# Patient Record
Sex: FEMALE | Race: WHITE | NOT HISPANIC OR LATINO | URBAN - METROPOLITAN AREA
[De-identification: names, ages, dates, MRNs, and addresses within clinical notes are randomized per-mention and may not be internally consistent; named-entity substitution may affect disease eponyms.]

---

## 2018-01-01 ENCOUNTER — INPATIENT (INPATIENT)
Facility: HOSPITAL | Age: 78
LOS: 8 days | DRG: 377 | End: 2018-05-06
Attending: INTERNAL MEDICINE | Admitting: HOSPITALIST
Payer: MEDICARE

## 2018-01-01 VITALS
WEIGHT: 138.01 LBS | OXYGEN SATURATION: 100 % | HEIGHT: 66 IN | DIASTOLIC BLOOD PRESSURE: 68 MMHG | HEART RATE: 87 BPM | SYSTOLIC BLOOD PRESSURE: 118 MMHG | RESPIRATION RATE: 16 BRPM | TEMPERATURE: 98 F

## 2018-01-01 VITALS — HEART RATE: 101 BPM

## 2018-01-01 DIAGNOSIS — J96.01 ACUTE RESPIRATORY FAILURE WITH HYPOXIA: ICD-10-CM

## 2018-01-01 DIAGNOSIS — K92.2 GASTROINTESTINAL HEMORRHAGE, UNSPECIFIED: ICD-10-CM

## 2018-01-01 DIAGNOSIS — E87.1 HYPO-OSMOLALITY AND HYPONATREMIA: ICD-10-CM

## 2018-01-01 DIAGNOSIS — D50.0 IRON DEFICIENCY ANEMIA SECONDARY TO BLOOD LOSS (CHRONIC): ICD-10-CM

## 2018-01-01 DIAGNOSIS — R74.8 ABNORMAL LEVELS OF OTHER SERUM ENZYMES: ICD-10-CM

## 2018-01-01 DIAGNOSIS — K92.1 MELENA: ICD-10-CM

## 2018-01-01 DIAGNOSIS — E11.8 TYPE 2 DIABETES MELLITUS WITH UNSPECIFIED COMPLICATIONS: ICD-10-CM

## 2018-01-01 DIAGNOSIS — I38 ENDOCARDITIS, VALVE UNSPECIFIED: ICD-10-CM

## 2018-01-01 DIAGNOSIS — Z29.9 ENCOUNTER FOR PROPHYLACTIC MEASURES, UNSPECIFIED: ICD-10-CM

## 2018-01-01 DIAGNOSIS — R57.0 CARDIOGENIC SHOCK: ICD-10-CM

## 2018-01-01 DIAGNOSIS — R55 SYNCOPE AND COLLAPSE: ICD-10-CM

## 2018-01-01 DIAGNOSIS — J18.9 PNEUMONIA, UNSPECIFIED ORGANISM: ICD-10-CM

## 2018-01-01 DIAGNOSIS — E86.0 DEHYDRATION: ICD-10-CM

## 2018-01-01 DIAGNOSIS — I25.10 ATHEROSCLEROTIC HEART DISEASE OF NATIVE CORONARY ARTERY WITHOUT ANGINA PECTORIS: ICD-10-CM

## 2018-01-01 DIAGNOSIS — I10 ESSENTIAL (PRIMARY) HYPERTENSION: ICD-10-CM

## 2018-01-01 DIAGNOSIS — I50.21 ACUTE SYSTOLIC (CONGESTIVE) HEART FAILURE: ICD-10-CM

## 2018-01-01 DIAGNOSIS — G93.40 ENCEPHALOPATHY, UNSPECIFIED: ICD-10-CM

## 2018-01-01 LAB
ABO RH CONFIRMATION: SIGNIFICANT CHANGE UP
ALBUMIN SERPL ELPH-MCNC: 2.2 G/DL — LOW (ref 3.3–5.2)
ALBUMIN SERPL ELPH-MCNC: 2.7 G/DL — LOW (ref 3.3–5.2)
ALBUMIN SERPL ELPH-MCNC: 2.9 G/DL — LOW (ref 3.3–5.2)
ALP SERPL-CCNC: 45 U/L — SIGNIFICANT CHANGE UP (ref 40–120)
ALP SERPL-CCNC: 50 U/L — SIGNIFICANT CHANGE UP (ref 40–120)
ALP SERPL-CCNC: 60 U/L — SIGNIFICANT CHANGE UP (ref 40–120)
ALT FLD-CCNC: 17 U/L — SIGNIFICANT CHANGE UP
ALT FLD-CCNC: 17 U/L — SIGNIFICANT CHANGE UP
ALT FLD-CCNC: 8 U/L — SIGNIFICANT CHANGE UP
AMMONIA BLD-MCNC: 18 UMOL/L — SIGNIFICANT CHANGE UP (ref 11–55)
ANION GAP SERPL CALC-SCNC: 11 MMOL/L — SIGNIFICANT CHANGE UP (ref 5–17)
ANION GAP SERPL CALC-SCNC: 12 MMOL/L — SIGNIFICANT CHANGE UP (ref 5–17)
ANION GAP SERPL CALC-SCNC: 14 MMOL/L — SIGNIFICANT CHANGE UP (ref 5–17)
ANION GAP SERPL CALC-SCNC: 14 MMOL/L — SIGNIFICANT CHANGE UP (ref 5–17)
ANION GAP SERPL CALC-SCNC: 15 MMOL/L — SIGNIFICANT CHANGE UP (ref 5–17)
ANION GAP SERPL CALC-SCNC: 16 MMOL/L — SIGNIFICANT CHANGE UP (ref 5–17)
ANION GAP SERPL CALC-SCNC: 16 MMOL/L — SIGNIFICANT CHANGE UP (ref 5–17)
ANION GAP SERPL CALC-SCNC: 17 MMOL/L — SIGNIFICANT CHANGE UP (ref 5–17)
ANION GAP SERPL CALC-SCNC: 27 MMOL/L — HIGH (ref 5–17)
ANISOCYTOSIS BLD QL: SLIGHT — SIGNIFICANT CHANGE UP
APTT BLD: 24.7 SEC — LOW (ref 27.5–37.4)
APTT BLD: 27.8 SEC — SIGNIFICANT CHANGE UP (ref 27.5–37.4)
APTT BLD: 42.4 SEC — HIGH (ref 27.5–37.4)
AST SERPL-CCNC: 21 U/L — SIGNIFICANT CHANGE UP
AST SERPL-CCNC: 44 U/L — HIGH
AST SERPL-CCNC: 74 U/L — HIGH
BASOPHILS # BLD AUTO: 0 K/UL — SIGNIFICANT CHANGE UP (ref 0–0.2)
BASOPHILS NFR BLD AUTO: 0.1 % — SIGNIFICANT CHANGE UP (ref 0–2)
BASOPHILS NFR BLD AUTO: 0.1 % — SIGNIFICANT CHANGE UP (ref 0–2)
BASOPHILS NFR BLD AUTO: 0.2 % — SIGNIFICANT CHANGE UP (ref 0–2)
BASOPHILS NFR BLD AUTO: 0.2 % — SIGNIFICANT CHANGE UP (ref 0–2)
BILIRUB SERPL-MCNC: 0.3 MG/DL — LOW (ref 0.4–2)
BILIRUB SERPL-MCNC: 0.4 MG/DL — SIGNIFICANT CHANGE UP (ref 0.4–2)
BILIRUB SERPL-MCNC: 0.4 MG/DL — SIGNIFICANT CHANGE UP (ref 0.4–2)
BLD GP AB SCN SERPL QL: SIGNIFICANT CHANGE UP
BUN SERPL-MCNC: 17 MG/DL — SIGNIFICANT CHANGE UP (ref 8–20)
BUN SERPL-MCNC: 17 MG/DL — SIGNIFICANT CHANGE UP (ref 8–20)
BUN SERPL-MCNC: 18 MG/DL — SIGNIFICANT CHANGE UP (ref 8–20)
BUN SERPL-MCNC: 18 MG/DL — SIGNIFICANT CHANGE UP (ref 8–20)
BUN SERPL-MCNC: 20 MG/DL — SIGNIFICANT CHANGE UP (ref 8–20)
BUN SERPL-MCNC: 22 MG/DL — HIGH (ref 8–20)
BUN SERPL-MCNC: 36 MG/DL — HIGH (ref 8–20)
BUN SERPL-MCNC: 36 MG/DL — HIGH (ref 8–20)
BUN SERPL-MCNC: 37 MG/DL — HIGH (ref 8–20)
BUN SERPL-MCNC: 39 MG/DL — HIGH (ref 8–20)
BUN SERPL-MCNC: 40 MG/DL — HIGH (ref 8–20)
BUN SERPL-MCNC: 42 MG/DL — HIGH (ref 8–20)
CALCIUM SERPL-MCNC: 6.7 MG/DL — LOW (ref 8.6–10.2)
CALCIUM SERPL-MCNC: 7.2 MG/DL — LOW (ref 8.6–10.2)
CALCIUM SERPL-MCNC: 8.1 MG/DL — LOW (ref 8.6–10.2)
CALCIUM SERPL-MCNC: 8.2 MG/DL — LOW (ref 8.6–10.2)
CALCIUM SERPL-MCNC: 8.2 MG/DL — LOW (ref 8.6–10.2)
CALCIUM SERPL-MCNC: 8.3 MG/DL — LOW (ref 8.6–10.2)
CALCIUM SERPL-MCNC: 8.3 MG/DL — LOW (ref 8.6–10.2)
CALCIUM SERPL-MCNC: 8.5 MG/DL — LOW (ref 8.6–10.2)
CALCIUM SERPL-MCNC: 8.6 MG/DL — SIGNIFICANT CHANGE UP (ref 8.6–10.2)
CALCIUM SERPL-MCNC: 8.7 MG/DL — SIGNIFICANT CHANGE UP (ref 8.6–10.2)
CHLORIDE SERPL-SCNC: 100 MMOL/L — SIGNIFICANT CHANGE UP (ref 98–107)
CHLORIDE SERPL-SCNC: 101 MMOL/L — SIGNIFICANT CHANGE UP (ref 98–107)
CHLORIDE SERPL-SCNC: 81 MMOL/L — LOW (ref 98–107)
CHLORIDE SERPL-SCNC: 91 MMOL/L — LOW (ref 98–107)
CHLORIDE SERPL-SCNC: 92 MMOL/L — LOW (ref 98–107)
CHLORIDE SERPL-SCNC: 93 MMOL/L — LOW (ref 98–107)
CHLORIDE SERPL-SCNC: 94 MMOL/L — LOW (ref 98–107)
CHLORIDE SERPL-SCNC: 95 MMOL/L — LOW (ref 98–107)
CHLORIDE SERPL-SCNC: 95 MMOL/L — LOW (ref 98–107)
CHLORIDE UR-SCNC: 102 MMOL/L — SIGNIFICANT CHANGE UP
CK SERPL-CCNC: 103 U/L — SIGNIFICANT CHANGE UP (ref 25–170)
CK SERPL-CCNC: 92 U/L — SIGNIFICANT CHANGE UP (ref 25–170)
CO2 SERPL-SCNC: 14 MMOL/L — LOW (ref 22–29)
CO2 SERPL-SCNC: 21 MMOL/L — LOW (ref 22–29)
CO2 SERPL-SCNC: 21 MMOL/L — LOW (ref 22–29)
CO2 SERPL-SCNC: 22 MMOL/L — SIGNIFICANT CHANGE UP (ref 22–29)
CO2 SERPL-SCNC: 23 MMOL/L — SIGNIFICANT CHANGE UP (ref 22–29)
CO2 SERPL-SCNC: 23 MMOL/L — SIGNIFICANT CHANGE UP (ref 22–29)
CO2 SERPL-SCNC: 25 MMOL/L — SIGNIFICANT CHANGE UP (ref 22–29)
CO2 SERPL-SCNC: 27 MMOL/L — SIGNIFICANT CHANGE UP (ref 22–29)
CO2 SERPL-SCNC: 29 MMOL/L — SIGNIFICANT CHANGE UP (ref 22–29)
CREAT SERPL-MCNC: 1.02 MG/DL — SIGNIFICANT CHANGE UP (ref 0.5–1.3)
CREAT SERPL-MCNC: 1.09 MG/DL — SIGNIFICANT CHANGE UP (ref 0.5–1.3)
CREAT SERPL-MCNC: 1.1 MG/DL — SIGNIFICANT CHANGE UP (ref 0.5–1.3)
CREAT SERPL-MCNC: 1.14 MG/DL — SIGNIFICANT CHANGE UP (ref 0.5–1.3)
CREAT SERPL-MCNC: 1.33 MG/DL — HIGH (ref 0.5–1.3)
CREAT SERPL-MCNC: 1.41 MG/DL — HIGH (ref 0.5–1.3)
CREAT SERPL-MCNC: 1.52 MG/DL — HIGH (ref 0.5–1.3)
CREAT SERPL-MCNC: 1.54 MG/DL — HIGH (ref 0.5–1.3)
CREAT SERPL-MCNC: 1.57 MG/DL — HIGH (ref 0.5–1.3)
CREAT SERPL-MCNC: 1.64 MG/DL — HIGH (ref 0.5–1.3)
CREAT SERPL-MCNC: 1.7 MG/DL — HIGH (ref 0.5–1.3)
CREAT SERPL-MCNC: 1.86 MG/DL — HIGH (ref 0.5–1.3)
CULTURE RESULTS: SIGNIFICANT CHANGE UP
CULTURE RESULTS: SIGNIFICANT CHANGE UP
EOSINOPHIL # BLD AUTO: 0 K/UL — SIGNIFICANT CHANGE UP (ref 0–0.5)
EOSINOPHIL # BLD AUTO: 0.3 K/UL — SIGNIFICANT CHANGE UP (ref 0–0.5)
EOSINOPHIL NFR BLD AUTO: 0 % — SIGNIFICANT CHANGE UP (ref 0–6)
EOSINOPHIL NFR BLD AUTO: 0 % — SIGNIFICANT CHANGE UP (ref 0–6)
EOSINOPHIL NFR BLD AUTO: 0.2 % — SIGNIFICANT CHANGE UP (ref 0–6)
EOSINOPHIL NFR BLD AUTO: 2 % — SIGNIFICANT CHANGE UP (ref 0–6)
ETHANOL SERPL-MCNC: <10 MG/DL — SIGNIFICANT CHANGE UP
GAS PNL BLDA: SIGNIFICANT CHANGE UP
GLUCOSE BLDC GLUCOMTR-MCNC: 104 MG/DL — HIGH (ref 70–99)
GLUCOSE BLDC GLUCOMTR-MCNC: 110 MG/DL — HIGH (ref 70–99)
GLUCOSE BLDC GLUCOMTR-MCNC: 112 MG/DL — HIGH (ref 70–99)
GLUCOSE BLDC GLUCOMTR-MCNC: 112 MG/DL — HIGH (ref 70–99)
GLUCOSE BLDC GLUCOMTR-MCNC: 121 MG/DL — HIGH (ref 70–99)
GLUCOSE BLDC GLUCOMTR-MCNC: 121 MG/DL — HIGH (ref 70–99)
GLUCOSE BLDC GLUCOMTR-MCNC: 133 MG/DL — HIGH (ref 70–99)
GLUCOSE BLDC GLUCOMTR-MCNC: 133 MG/DL — HIGH (ref 70–99)
GLUCOSE BLDC GLUCOMTR-MCNC: 141 MG/DL — HIGH (ref 70–99)
GLUCOSE BLDC GLUCOMTR-MCNC: 143 MG/DL — HIGH (ref 70–99)
GLUCOSE BLDC GLUCOMTR-MCNC: 148 MG/DL — HIGH (ref 70–99)
GLUCOSE BLDC GLUCOMTR-MCNC: 148 MG/DL — HIGH (ref 70–99)
GLUCOSE BLDC GLUCOMTR-MCNC: 150 MG/DL — HIGH (ref 70–99)
GLUCOSE BLDC GLUCOMTR-MCNC: 150 MG/DL — HIGH (ref 70–99)
GLUCOSE BLDC GLUCOMTR-MCNC: 155 MG/DL — HIGH (ref 70–99)
GLUCOSE BLDC GLUCOMTR-MCNC: 161 MG/DL — HIGH (ref 70–99)
GLUCOSE BLDC GLUCOMTR-MCNC: 161 MG/DL — HIGH (ref 70–99)
GLUCOSE BLDC GLUCOMTR-MCNC: 167 MG/DL — HIGH (ref 70–99)
GLUCOSE BLDC GLUCOMTR-MCNC: 184 MG/DL — HIGH (ref 70–99)
GLUCOSE BLDC GLUCOMTR-MCNC: 185 MG/DL — HIGH (ref 70–99)
GLUCOSE BLDC GLUCOMTR-MCNC: 188 MG/DL — HIGH (ref 70–99)
GLUCOSE BLDC GLUCOMTR-MCNC: 188 MG/DL — HIGH (ref 70–99)
GLUCOSE BLDC GLUCOMTR-MCNC: 192 MG/DL — HIGH (ref 70–99)
GLUCOSE BLDC GLUCOMTR-MCNC: 196 MG/DL — HIGH (ref 70–99)
GLUCOSE BLDC GLUCOMTR-MCNC: 204 MG/DL — HIGH (ref 70–99)
GLUCOSE BLDC GLUCOMTR-MCNC: 208 MG/DL — HIGH (ref 70–99)
GLUCOSE BLDC GLUCOMTR-MCNC: 211 MG/DL — HIGH (ref 70–99)
GLUCOSE BLDC GLUCOMTR-MCNC: 218 MG/DL — HIGH (ref 70–99)
GLUCOSE BLDC GLUCOMTR-MCNC: 234 MG/DL — HIGH (ref 70–99)
GLUCOSE BLDC GLUCOMTR-MCNC: 239 MG/DL — HIGH (ref 70–99)
GLUCOSE BLDC GLUCOMTR-MCNC: 248 MG/DL — HIGH (ref 70–99)
GLUCOSE BLDC GLUCOMTR-MCNC: 33 MG/DL — CRITICAL LOW (ref 70–99)
GLUCOSE BLDC GLUCOMTR-MCNC: 72 MG/DL — SIGNIFICANT CHANGE UP (ref 70–99)
GLUCOSE BLDC GLUCOMTR-MCNC: 78 MG/DL — SIGNIFICANT CHANGE UP (ref 70–99)
GLUCOSE BLDC GLUCOMTR-MCNC: 79 MG/DL — SIGNIFICANT CHANGE UP (ref 70–99)
GLUCOSE BLDC GLUCOMTR-MCNC: 81 MG/DL — SIGNIFICANT CHANGE UP (ref 70–99)
GLUCOSE BLDC GLUCOMTR-MCNC: 88 MG/DL — SIGNIFICANT CHANGE UP (ref 70–99)
GLUCOSE BLDC GLUCOMTR-MCNC: 91 MG/DL — SIGNIFICANT CHANGE UP (ref 70–99)
GLUCOSE BLDC GLUCOMTR-MCNC: 97 MG/DL — SIGNIFICANT CHANGE UP (ref 70–99)
GLUCOSE SERPL-MCNC: 120 MG/DL — HIGH (ref 70–115)
GLUCOSE SERPL-MCNC: 122 MG/DL — HIGH (ref 70–115)
GLUCOSE SERPL-MCNC: 126 MG/DL — HIGH (ref 70–115)
GLUCOSE SERPL-MCNC: 148 MG/DL — HIGH (ref 70–115)
GLUCOSE SERPL-MCNC: 159 MG/DL — HIGH (ref 70–115)
GLUCOSE SERPL-MCNC: 165 MG/DL — HIGH (ref 70–115)
GLUCOSE SERPL-MCNC: 195 MG/DL — HIGH (ref 70–115)
GLUCOSE SERPL-MCNC: 196 MG/DL — HIGH (ref 70–115)
GLUCOSE SERPL-MCNC: 200 MG/DL — HIGH (ref 70–115)
GLUCOSE SERPL-MCNC: 234 MG/DL — HIGH (ref 70–115)
GLUCOSE SERPL-MCNC: 257 MG/DL — HIGH (ref 70–115)
GLUCOSE SERPL-MCNC: 77 MG/DL — SIGNIFICANT CHANGE UP (ref 70–115)
HBA1C BLD-MCNC: 5 % — SIGNIFICANT CHANGE UP (ref 4–5.6)
HCT VFR BLD CALC: 19.5 % — CRITICAL LOW (ref 37–47)
HCT VFR BLD CALC: 21 % — CRITICAL LOW (ref 37–47)
HCT VFR BLD CALC: 23.5 % — LOW (ref 37–47)
HCT VFR BLD CALC: 23.9 % — LOW (ref 37–47)
HCT VFR BLD CALC: 24.1 % — LOW (ref 37–47)
HCT VFR BLD CALC: 24.4 % — LOW (ref 37–47)
HCT VFR BLD CALC: 25.2 % — LOW (ref 37–47)
HCT VFR BLD CALC: 26.3 % — LOW (ref 37–47)
HCT VFR BLD CALC: 26.7 % — LOW (ref 37–47)
HCT VFR BLD CALC: 27.6 % — LOW (ref 37–47)
HCT VFR BLD CALC: 27.7 % — LOW (ref 37–47)
HCT VFR BLD CALC: 27.9 % — LOW (ref 37–47)
HCT VFR BLD CALC: 28.6 % — LOW (ref 37–47)
HCT VFR BLD CALC: 28.7 % — LOW (ref 37–47)
HCT VFR BLD CALC: 30.5 % — LOW (ref 37–47)
HCT VFR BLD CALC: 30.8 % — LOW (ref 37–47)
HCT VFR BLD CALC: 35.3 % — LOW (ref 37–47)
HGB BLD-MCNC: 10 G/DL — LOW (ref 12–16)
HGB BLD-MCNC: 10.1 G/DL — LOW (ref 12–16)
HGB BLD-MCNC: 10.4 G/DL — LOW (ref 12–16)
HGB BLD-MCNC: 11.8 G/DL — LOW (ref 12–16)
HGB BLD-MCNC: 6.5 G/DL — CRITICAL LOW (ref 12–16)
HGB BLD-MCNC: 6.7 G/DL — CRITICAL LOW (ref 12–16)
HGB BLD-MCNC: 7.4 G/DL — LOW (ref 12–16)
HGB BLD-MCNC: 7.8 G/DL — LOW (ref 12–16)
HGB BLD-MCNC: 7.8 G/DL — LOW (ref 12–16)
HGB BLD-MCNC: 8.2 G/DL — LOW (ref 12–16)
HGB BLD-MCNC: 8.3 G/DL — LOW (ref 12–16)
HGB BLD-MCNC: 8.7 G/DL — LOW (ref 12–16)
HGB BLD-MCNC: 9 G/DL — LOW (ref 12–16)
HGB BLD-MCNC: 9 G/DL — LOW (ref 12–16)
HGB BLD-MCNC: 9.1 G/DL — LOW (ref 12–16)
HGB BLD-MCNC: 9.3 G/DL — LOW (ref 12–16)
HGB BLD-MCNC: 9.6 G/DL — LOW (ref 12–16)
HGB BLD-MCNC: 9.6 G/DL — LOW (ref 12–16)
HYPOCHROMIA BLD QL: SIGNIFICANT CHANGE UP
HYPOCHROMIA BLD QL: SLIGHT — SIGNIFICANT CHANGE UP
INR BLD: 1.15 RATIO — SIGNIFICANT CHANGE UP (ref 0.88–1.16)
INR BLD: 1.2 RATIO — HIGH (ref 0.88–1.16)
INR BLD: 2.21 RATIO — HIGH (ref 0.88–1.16)
LYMPHOCYTES # BLD AUTO: 1.6 K/UL — SIGNIFICANT CHANGE UP (ref 1–4.8)
LYMPHOCYTES # BLD AUTO: 13.8 % — LOW (ref 20–55)
LYMPHOCYTES # BLD AUTO: 16.6 % — LOW (ref 20–55)
LYMPHOCYTES # BLD AUTO: 16.6 % — LOW (ref 20–55)
LYMPHOCYTES # BLD AUTO: 2 K/UL — SIGNIFICANT CHANGE UP (ref 1–4.8)
LYMPHOCYTES # BLD AUTO: 2.1 K/UL — SIGNIFICANT CHANGE UP (ref 1–4.8)
LYMPHOCYTES # BLD AUTO: 20.6 % — SIGNIFICANT CHANGE UP (ref 20–55)
LYMPHOCYTES # BLD AUTO: 3.3 K/UL — SIGNIFICANT CHANGE UP (ref 1–4.8)
MACROCYTES BLD QL: SLIGHT — SIGNIFICANT CHANGE UP
MAGNESIUM SERPL-MCNC: 1.3 MG/DL — LOW (ref 1.6–2.6)
MAGNESIUM SERPL-MCNC: 1.5 MG/DL — LOW (ref 1.6–2.6)
MAGNESIUM SERPL-MCNC: 1.7 MG/DL — SIGNIFICANT CHANGE UP (ref 1.6–2.6)
MAGNESIUM SERPL-MCNC: 2 MG/DL — SIGNIFICANT CHANGE UP (ref 1.6–2.6)
MAGNESIUM SERPL-MCNC: 2.1 MG/DL — SIGNIFICANT CHANGE UP (ref 1.6–2.6)
MAGNESIUM SERPL-MCNC: 5.7 MG/DL — HIGH (ref 1.6–2.6)
MCHC RBC-ENTMCNC: 27.6 PG — SIGNIFICANT CHANGE UP (ref 27–31)
MCHC RBC-ENTMCNC: 27.6 PG — SIGNIFICANT CHANGE UP (ref 27–31)
MCHC RBC-ENTMCNC: 27.8 PG — SIGNIFICANT CHANGE UP (ref 27–31)
MCHC RBC-ENTMCNC: 27.9 PG — SIGNIFICANT CHANGE UP (ref 27–31)
MCHC RBC-ENTMCNC: 28 PG — SIGNIFICANT CHANGE UP (ref 27–31)
MCHC RBC-ENTMCNC: 28.2 PG — SIGNIFICANT CHANGE UP (ref 27–31)
MCHC RBC-ENTMCNC: 28.4 PG — SIGNIFICANT CHANGE UP (ref 27–31)
MCHC RBC-ENTMCNC: 28.5 PG — SIGNIFICANT CHANGE UP (ref 27–31)
MCHC RBC-ENTMCNC: 28.7 PG — SIGNIFICANT CHANGE UP (ref 27–31)
MCHC RBC-ENTMCNC: 28.7 PG — SIGNIFICANT CHANGE UP (ref 27–31)
MCHC RBC-ENTMCNC: 28.8 PG — SIGNIFICANT CHANGE UP (ref 27–31)
MCHC RBC-ENTMCNC: 29 PG — SIGNIFICANT CHANGE UP (ref 27–31)
MCHC RBC-ENTMCNC: 29.1 PG — SIGNIFICANT CHANGE UP (ref 27–31)
MCHC RBC-ENTMCNC: 29.3 PG — SIGNIFICANT CHANGE UP (ref 27–31)
MCHC RBC-ENTMCNC: 31.5 G/DL — LOW (ref 32–36)
MCHC RBC-ENTMCNC: 31.9 G/DL — LOW (ref 32–36)
MCHC RBC-ENTMCNC: 32 G/DL — SIGNIFICANT CHANGE UP (ref 32–36)
MCHC RBC-ENTMCNC: 32.5 G/DL — SIGNIFICANT CHANGE UP (ref 32–36)
MCHC RBC-ENTMCNC: 32.6 G/DL — SIGNIFICANT CHANGE UP (ref 32–36)
MCHC RBC-ENTMCNC: 32.9 G/DL — SIGNIFICANT CHANGE UP (ref 32–36)
MCHC RBC-ENTMCNC: 33.1 G/DL — SIGNIFICANT CHANGE UP (ref 32–36)
MCHC RBC-ENTMCNC: 33.3 G/DL — SIGNIFICANT CHANGE UP (ref 32–36)
MCHC RBC-ENTMCNC: 33.4 G/DL — SIGNIFICANT CHANGE UP (ref 32–36)
MCHC RBC-ENTMCNC: 33.4 G/DL — SIGNIFICANT CHANGE UP (ref 32–36)
MCHC RBC-ENTMCNC: 33.8 G/DL — SIGNIFICANT CHANGE UP (ref 32–36)
MCHC RBC-ENTMCNC: 34 G/DL — SIGNIFICANT CHANGE UP (ref 32–36)
MCHC RBC-ENTMCNC: 34.2 G/DL — SIGNIFICANT CHANGE UP (ref 32–36)
MCHC RBC-ENTMCNC: 34.7 G/DL — SIGNIFICANT CHANGE UP (ref 32–36)
MCV RBC AUTO: 82 FL — SIGNIFICANT CHANGE UP (ref 81–99)
MCV RBC AUTO: 83 FL — SIGNIFICANT CHANGE UP (ref 81–99)
MCV RBC AUTO: 84.3 FL — SIGNIFICANT CHANGE UP (ref 81–99)
MCV RBC AUTO: 84.3 FL — SIGNIFICANT CHANGE UP (ref 81–99)
MCV RBC AUTO: 84.5 FL — SIGNIFICANT CHANGE UP (ref 81–99)
MCV RBC AUTO: 84.9 FL — SIGNIFICANT CHANGE UP (ref 81–99)
MCV RBC AUTO: 85.2 FL — SIGNIFICANT CHANGE UP (ref 81–99)
MCV RBC AUTO: 85.9 FL — SIGNIFICANT CHANGE UP (ref 81–99)
MCV RBC AUTO: 86.3 FL — SIGNIFICANT CHANGE UP (ref 81–99)
MCV RBC AUTO: 86.3 FL — SIGNIFICANT CHANGE UP (ref 81–99)
MCV RBC AUTO: 86.7 FL — SIGNIFICANT CHANGE UP (ref 81–99)
MCV RBC AUTO: 87.5 FL — SIGNIFICANT CHANGE UP (ref 81–99)
MCV RBC AUTO: 87.5 FL — SIGNIFICANT CHANGE UP (ref 81–99)
MCV RBC AUTO: 87.9 FL — SIGNIFICANT CHANGE UP (ref 81–99)
MCV RBC AUTO: 88.7 FL — SIGNIFICANT CHANGE UP (ref 81–99)
MCV RBC AUTO: 90.1 FL — SIGNIFICANT CHANGE UP (ref 81–99)
MICROCYTES BLD QL: SLIGHT — SIGNIFICANT CHANGE UP
MONOCYTES # BLD AUTO: 0.8 K/UL — SIGNIFICANT CHANGE UP (ref 0–0.8)
MONOCYTES # BLD AUTO: 1.1 K/UL — HIGH (ref 0–0.8)
MONOCYTES # BLD AUTO: 1.2 K/UL — HIGH (ref 0–0.8)
MONOCYTES # BLD AUTO: 1.4 K/UL — HIGH (ref 0–0.8)
MONOCYTES NFR BLD AUTO: 10.1 % — HIGH (ref 3–10)
MONOCYTES NFR BLD AUTO: 11 % — HIGH (ref 3–10)
MONOCYTES NFR BLD AUTO: 6.6 % — SIGNIFICANT CHANGE UP (ref 3–10)
MONOCYTES NFR BLD AUTO: 7.1 % — SIGNIFICANT CHANGE UP (ref 3–10)
NEUTROPHILS # BLD AUTO: 11.2 K/UL — HIGH (ref 1.8–8)
NEUTROPHILS # BLD AUTO: 8.7 K/UL — HIGH (ref 1.8–8)
NEUTROPHILS # BLD AUTO: 8.8 K/UL — HIGH (ref 1.8–8)
NEUTROPHILS # BLD AUTO: 9.8 K/UL — HIGH (ref 1.8–8)
NEUTROPHILS NFR BLD AUTO: 69.7 % — SIGNIFICANT CHANGE UP (ref 37–73)
NEUTROPHILS NFR BLD AUTO: 71.6 % — SIGNIFICANT CHANGE UP (ref 37–73)
NEUTROPHILS NFR BLD AUTO: 75.7 % — HIGH (ref 37–73)
NEUTROPHILS NFR BLD AUTO: 76 % — HIGH (ref 37–73)
OB PNL STL: POSITIVE
OSMOLALITY SERPL: 286 MOSM/KG — SIGNIFICANT CHANGE UP (ref 280–300)
OSMOLALITY UR: 322 MOSM/KG — SIGNIFICANT CHANGE UP (ref 300–1000)
PHOSPHATE SERPL-MCNC: 2 MG/DL — LOW (ref 2.4–4.7)
PHOSPHATE SERPL-MCNC: 2.3 MG/DL — LOW (ref 2.4–4.7)
PHOSPHATE SERPL-MCNC: 2.6 MG/DL — SIGNIFICANT CHANGE UP (ref 2.4–4.7)
PLAT MORPH BLD: NORMAL — SIGNIFICANT CHANGE UP
PLATELET # BLD AUTO: 214 K/UL — SIGNIFICANT CHANGE UP (ref 150–400)
PLATELET # BLD AUTO: 227 K/UL — SIGNIFICANT CHANGE UP (ref 150–400)
PLATELET # BLD AUTO: 249 K/UL — SIGNIFICANT CHANGE UP (ref 150–400)
PLATELET # BLD AUTO: 323 K/UL — SIGNIFICANT CHANGE UP (ref 150–400)
PLATELET # BLD AUTO: 369 K/UL — SIGNIFICANT CHANGE UP (ref 150–400)
PLATELET # BLD AUTO: 383 K/UL — SIGNIFICANT CHANGE UP (ref 150–400)
PLATELET # BLD AUTO: 419 K/UL — HIGH (ref 150–400)
PLATELET # BLD AUTO: 419 K/UL — HIGH (ref 150–400)
PLATELET # BLD AUTO: 432 K/UL — HIGH (ref 150–400)
PLATELET # BLD AUTO: 448 K/UL — HIGH (ref 150–400)
PLATELET # BLD AUTO: 472 K/UL — HIGH (ref 150–400)
PLATELET # BLD AUTO: 472 K/UL — HIGH (ref 150–400)
PLATELET # BLD AUTO: 504 K/UL — HIGH (ref 150–400)
PLATELET # BLD AUTO: 511 K/UL — HIGH (ref 150–400)
PLATELET # BLD AUTO: 512 K/UL — HIGH (ref 150–400)
PLATELET # BLD AUTO: 540 K/UL — HIGH (ref 150–400)
POIKILOCYTOSIS BLD QL AUTO: SLIGHT — SIGNIFICANT CHANGE UP
POIKILOCYTOSIS BLD QL AUTO: SLIGHT — SIGNIFICANT CHANGE UP
POLYCHROMASIA BLD QL SMEAR: SLIGHT — SIGNIFICANT CHANGE UP
POTASSIUM SERPL-MCNC: 3.3 MMOL/L — LOW (ref 3.5–5.3)
POTASSIUM SERPL-MCNC: 3.5 MMOL/L — SIGNIFICANT CHANGE UP (ref 3.5–5.3)
POTASSIUM SERPL-MCNC: 3.6 MMOL/L — SIGNIFICANT CHANGE UP (ref 3.5–5.3)
POTASSIUM SERPL-MCNC: 3.6 MMOL/L — SIGNIFICANT CHANGE UP (ref 3.5–5.3)
POTASSIUM SERPL-MCNC: 3.7 MMOL/L — SIGNIFICANT CHANGE UP (ref 3.5–5.3)
POTASSIUM SERPL-MCNC: 3.8 MMOL/L — SIGNIFICANT CHANGE UP (ref 3.5–5.3)
POTASSIUM SERPL-MCNC: 3.9 MMOL/L — SIGNIFICANT CHANGE UP (ref 3.5–5.3)
POTASSIUM SERPL-MCNC: 4 MMOL/L — SIGNIFICANT CHANGE UP (ref 3.5–5.3)
POTASSIUM SERPL-MCNC: 4.3 MMOL/L — SIGNIFICANT CHANGE UP (ref 3.5–5.3)
POTASSIUM SERPL-MCNC: 4.6 MMOL/L — SIGNIFICANT CHANGE UP (ref 3.5–5.3)
POTASSIUM SERPL-MCNC: 4.7 MMOL/L — SIGNIFICANT CHANGE UP (ref 3.5–5.3)
POTASSIUM SERPL-MCNC: 4.8 MMOL/L — SIGNIFICANT CHANGE UP (ref 3.5–5.3)
POTASSIUM SERPL-SCNC: 3.3 MMOL/L — LOW (ref 3.5–5.3)
POTASSIUM SERPL-SCNC: 3.5 MMOL/L — SIGNIFICANT CHANGE UP (ref 3.5–5.3)
POTASSIUM SERPL-SCNC: 3.6 MMOL/L — SIGNIFICANT CHANGE UP (ref 3.5–5.3)
POTASSIUM SERPL-SCNC: 3.6 MMOL/L — SIGNIFICANT CHANGE UP (ref 3.5–5.3)
POTASSIUM SERPL-SCNC: 3.7 MMOL/L — SIGNIFICANT CHANGE UP (ref 3.5–5.3)
POTASSIUM SERPL-SCNC: 3.8 MMOL/L — SIGNIFICANT CHANGE UP (ref 3.5–5.3)
POTASSIUM SERPL-SCNC: 3.9 MMOL/L — SIGNIFICANT CHANGE UP (ref 3.5–5.3)
POTASSIUM SERPL-SCNC: 4 MMOL/L — SIGNIFICANT CHANGE UP (ref 3.5–5.3)
POTASSIUM SERPL-SCNC: 4.3 MMOL/L — SIGNIFICANT CHANGE UP (ref 3.5–5.3)
POTASSIUM SERPL-SCNC: 4.6 MMOL/L — SIGNIFICANT CHANGE UP (ref 3.5–5.3)
POTASSIUM SERPL-SCNC: 4.7 MMOL/L — SIGNIFICANT CHANGE UP (ref 3.5–5.3)
POTASSIUM SERPL-SCNC: 4.8 MMOL/L — SIGNIFICANT CHANGE UP (ref 3.5–5.3)
PROCALCITONIN SERPL-MCNC: 0.26 NG/ML — HIGH (ref 0–0.04)
PROCALCITONIN SERPL-MCNC: 0.46 NG/ML — HIGH (ref 0–0.04)
PROT SERPL-MCNC: 4.6 G/DL — LOW (ref 6.6–8.7)
PROT SERPL-MCNC: 5.5 G/DL — LOW (ref 6.6–8.7)
PROT SERPL-MCNC: 5.9 G/DL — LOW (ref 6.6–8.7)
PROTHROM AB SERPL-ACNC: 12.7 SEC — SIGNIFICANT CHANGE UP (ref 9.8–12.7)
PROTHROM AB SERPL-ACNC: 13.2 SEC — HIGH (ref 9.8–12.7)
PROTHROM AB SERPL-ACNC: 24.7 SEC — HIGH (ref 9.8–12.7)
RAPID RVP RESULT: SIGNIFICANT CHANGE UP
RBC # BLD: 2.29 M/UL — LOW (ref 4.4–5.2)
RBC # BLD: 2.33 M/UL — LOW (ref 4.4–5.2)
RBC # BLD: 2.65 M/UL — LOW (ref 4.4–5.2)
RBC # BLD: 2.77 M/UL — LOW (ref 4.4–5.2)
RBC # BLD: 2.79 M/UL — LOW (ref 4.4–5.2)
RBC # BLD: 2.86 M/UL — LOW (ref 4.4–5.2)
RBC # BLD: 2.99 M/UL — LOW (ref 4.4–5.2)
RBC # BLD: 3.14 M/UL — LOW (ref 4.4–5.2)
RBC # BLD: 3.17 M/UL — LOW (ref 4.4–5.2)
RBC # BLD: 3.28 M/UL — LOW (ref 4.4–5.2)
RBC # BLD: 3.3 M/UL — LOW (ref 4.4–5.2)
RBC # BLD: 3.37 M/UL — LOW (ref 4.4–5.2)
RBC # BLD: 3.38 M/UL — LOW (ref 4.4–5.2)
RBC # BLD: 3.55 M/UL — LOW (ref 4.4–5.2)
RBC # BLD: 3.57 M/UL — LOW (ref 4.4–5.2)
RBC # BLD: 4.07 M/UL — LOW (ref 4.4–5.2)
RBC # FLD: 15.1 % — SIGNIFICANT CHANGE UP (ref 11–15.6)
RBC # FLD: 15.1 % — SIGNIFICANT CHANGE UP (ref 11–15.6)
RBC # FLD: 15.2 % — SIGNIFICANT CHANGE UP (ref 11–15.6)
RBC # FLD: 15.4 % — SIGNIFICANT CHANGE UP (ref 11–15.6)
RBC # FLD: 15.4 % — SIGNIFICANT CHANGE UP (ref 11–15.6)
RBC # FLD: 15.5 % — SIGNIFICANT CHANGE UP (ref 11–15.6)
RBC # FLD: 15.8 % — HIGH (ref 11–15.6)
RBC # FLD: 15.9 % — HIGH (ref 11–15.6)
RBC # FLD: 16.4 % — HIGH (ref 11–15.6)
RBC # FLD: 16.5 % — HIGH (ref 11–15.6)
RBC # FLD: 16.5 % — HIGH (ref 11–15.6)
RBC # FLD: 16.6 % — HIGH (ref 11–15.6)
RBC # FLD: 16.6 % — HIGH (ref 11–15.6)
RBC # FLD: 16.7 % — HIGH (ref 11–15.6)
RBC # FLD: 16.8 % — HIGH (ref 11–15.6)
RBC # FLD: 16.8 % — HIGH (ref 11–15.6)
RBC BLD AUTO: ABNORMAL
SODIUM SERPL-SCNC: 121 MMOL/L — LOW (ref 135–145)
SODIUM SERPL-SCNC: 129 MMOL/L — LOW (ref 135–145)
SODIUM SERPL-SCNC: 130 MMOL/L — LOW (ref 135–145)
SODIUM SERPL-SCNC: 133 MMOL/L — LOW (ref 135–145)
SODIUM SERPL-SCNC: 134 MMOL/L — LOW (ref 135–145)
SODIUM SERPL-SCNC: 135 MMOL/L — SIGNIFICANT CHANGE UP (ref 135–145)
SODIUM SERPL-SCNC: 137 MMOL/L — SIGNIFICANT CHANGE UP (ref 135–145)
SODIUM SERPL-SCNC: 138 MMOL/L — SIGNIFICANT CHANGE UP (ref 135–145)
SODIUM SERPL-SCNC: 141 MMOL/L — SIGNIFICANT CHANGE UP (ref 135–145)
SODIUM UR-SCNC: 92 MMOL/L — SIGNIFICANT CHANGE UP
SPECIMEN SOURCE: SIGNIFICANT CHANGE UP
SPECIMEN SOURCE: SIGNIFICANT CHANGE UP
TROPONIN T SERPL-MCNC: 0.01 NG/ML — SIGNIFICANT CHANGE UP (ref 0–0.06)
TROPONIN T SERPL-MCNC: 1.98 NG/ML — HIGH (ref 0–0.06)
TROPONIN T SERPL-MCNC: 2.49 NG/ML — HIGH (ref 0–0.06)
TROPONIN T SERPL-MCNC: 2.68 NG/ML — HIGH (ref 0–0.06)
TROPONIN T SERPL-MCNC: 2.8 NG/ML — HIGH (ref 0–0.06)
TROPONIN T SERPL-MCNC: 3.57 NG/ML — HIGH (ref 0–0.06)
TSH SERPL-MCNC: 2.53 UIU/ML — SIGNIFICANT CHANGE UP (ref 0.27–4.2)
TYPE + AB SCN PNL BLD: SIGNIFICANT CHANGE UP
TYPE + AB SCN PNL BLD: SIGNIFICANT CHANGE UP
UUN UR-MCNC: 223 MG/DL — SIGNIFICANT CHANGE UP
VANCOMYCIN TROUGH SERPL-MCNC: 12.8 UG/ML — SIGNIFICANT CHANGE UP (ref 10–20)
VANCOMYCIN TROUGH SERPL-MCNC: 17.9 UG/ML — SIGNIFICANT CHANGE UP (ref 10–20)
VANCOMYCIN TROUGH SERPL-MCNC: 19.7 UG/ML — SIGNIFICANT CHANGE UP (ref 10–20)
VANCOMYCIN TROUGH SERPL-MCNC: 21.9 UG/ML — HIGH (ref 10–20)
VANCOMYCIN TROUGH SERPL-MCNC: 28.3 UG/ML — CRITICAL HIGH (ref 10–20)
WBC # BLD: 11.5 K/UL — HIGH (ref 4.8–10.8)
WBC # BLD: 12 K/UL — HIGH (ref 4.8–10.8)
WBC # BLD: 12.3 K/UL — HIGH (ref 4.8–10.8)
WBC # BLD: 12.5 K/UL — HIGH (ref 4.8–10.8)
WBC # BLD: 12.6 K/UL — HIGH (ref 4.8–10.8)
WBC # BLD: 12.8 K/UL — HIGH (ref 4.8–10.8)
WBC # BLD: 13.7 K/UL — HIGH (ref 4.8–10.8)
WBC # BLD: 13.8 K/UL — HIGH (ref 4.8–10.8)
WBC # BLD: 16.2 K/UL — HIGH (ref 4.8–10.8)
WBC # BLD: 17.2 K/UL — HIGH (ref 4.8–10.8)
WBC # BLD: 17.7 K/UL — HIGH (ref 4.8–10.8)
WBC # BLD: 18.5 K/UL — HIGH (ref 4.8–10.8)
WBC # BLD: 19.1 K/UL — HIGH (ref 4.8–10.8)
WBC # BLD: 23 K/UL — HIGH (ref 4.8–10.8)
WBC # BLD: 24.9 K/UL — HIGH (ref 4.8–10.8)
WBC # BLD: 9.5 K/UL — SIGNIFICANT CHANGE UP (ref 4.8–10.8)
WBC # FLD AUTO: 11.5 K/UL — HIGH (ref 4.8–10.8)
WBC # FLD AUTO: 12 K/UL — HIGH (ref 4.8–10.8)
WBC # FLD AUTO: 12.3 K/UL — HIGH (ref 4.8–10.8)
WBC # FLD AUTO: 12.5 K/UL — HIGH (ref 4.8–10.8)
WBC # FLD AUTO: 12.6 K/UL — HIGH (ref 4.8–10.8)
WBC # FLD AUTO: 12.8 K/UL — HIGH (ref 4.8–10.8)
WBC # FLD AUTO: 13.7 K/UL — HIGH (ref 4.8–10.8)
WBC # FLD AUTO: 13.8 K/UL — HIGH (ref 4.8–10.8)
WBC # FLD AUTO: 16.2 K/UL — HIGH (ref 4.8–10.8)
WBC # FLD AUTO: 17.2 K/UL — HIGH (ref 4.8–10.8)
WBC # FLD AUTO: 17.7 K/UL — HIGH (ref 4.8–10.8)
WBC # FLD AUTO: 18.5 K/UL — HIGH (ref 4.8–10.8)
WBC # FLD AUTO: 19.1 K/UL — HIGH (ref 4.8–10.8)
WBC # FLD AUTO: 23 K/UL — HIGH (ref 4.8–10.8)
WBC # FLD AUTO: 24.9 K/UL — HIGH (ref 4.8–10.8)
WBC # FLD AUTO: 9.5 K/UL — SIGNIFICANT CHANGE UP (ref 4.8–10.8)

## 2018-01-01 PROCEDURE — 12345: CPT | Mod: NC

## 2018-01-01 PROCEDURE — 99232 SBSQ HOSP IP/OBS MODERATE 35: CPT

## 2018-01-01 PROCEDURE — 93010 ELECTROCARDIOGRAM REPORT: CPT

## 2018-01-01 PROCEDURE — 70450 CT HEAD/BRAIN W/O DYE: CPT | Mod: 26

## 2018-01-01 PROCEDURE — 99233 SBSQ HOSP IP/OBS HIGH 50: CPT

## 2018-01-01 PROCEDURE — 99222 1ST HOSP IP/OBS MODERATE 55: CPT

## 2018-01-01 PROCEDURE — 71045 X-RAY EXAM CHEST 1 VIEW: CPT | Mod: 26

## 2018-01-01 PROCEDURE — 71046 X-RAY EXAM CHEST 2 VIEWS: CPT | Mod: 26

## 2018-01-01 PROCEDURE — 93458 L HRT ARTERY/VENTRICLE ANGIO: CPT | Mod: 26

## 2018-01-01 PROCEDURE — 93306 TTE W/DOPPLER COMPLETE: CPT | Mod: 26

## 2018-01-01 PROCEDURE — 99291 CRITICAL CARE FIRST HOUR: CPT

## 2018-01-01 RX ORDER — PANTOPRAZOLE SODIUM 20 MG/1
40 TABLET, DELAYED RELEASE ORAL ONCE
Qty: 0 | Refills: 0 | Status: DISCONTINUED | OUTPATIENT
Start: 2018-01-01 | End: 2018-01-01

## 2018-01-01 RX ORDER — CALCIUM GLUCONATE 100 MG/ML
2 VIAL (ML) INTRAVENOUS ONCE
Qty: 0 | Refills: 0 | Status: COMPLETED | OUTPATIENT
Start: 2018-01-01 | End: 2018-01-01

## 2018-01-01 RX ORDER — GLUCAGON INJECTION, SOLUTION 0.5 MG/.1ML
1 INJECTION, SOLUTION SUBCUTANEOUS ONCE
Qty: 0 | Refills: 0 | Status: DISCONTINUED | OUTPATIENT
Start: 2018-01-01 | End: 2018-01-01

## 2018-01-01 RX ORDER — SODIUM BICARBONATE 1 MEQ/ML
50 SYRINGE (ML) INTRAVENOUS ONCE
Qty: 0 | Refills: 0 | Status: COMPLETED | OUTPATIENT
Start: 2018-01-01 | End: 2018-01-01

## 2018-01-01 RX ORDER — LISINOPRIL 2.5 MG/1
2.5 TABLET ORAL DAILY
Qty: 0 | Refills: 0 | Status: DISCONTINUED | OUTPATIENT
Start: 2018-01-01 | End: 2018-01-01

## 2018-01-01 RX ORDER — POTASSIUM PHOSPHATE, MONOBASIC POTASSIUM PHOSPHATE, DIBASIC 236; 224 MG/ML; MG/ML
15 INJECTION, SOLUTION INTRAVENOUS ONCE
Qty: 0 | Refills: 0 | Status: COMPLETED | OUTPATIENT
Start: 2018-01-01 | End: 2018-01-01

## 2018-01-01 RX ORDER — DEXTROSE 50 % IN WATER 50 %
1 SYRINGE (ML) INTRAVENOUS ONCE
Qty: 0 | Refills: 0 | Status: DISCONTINUED | OUTPATIENT
Start: 2018-01-01 | End: 2018-01-01

## 2018-01-01 RX ORDER — VANCOMYCIN HCL 1 G
VIAL (EA) INTRAVENOUS
Qty: 0 | Refills: 0 | Status: DISCONTINUED | OUTPATIENT
Start: 2018-01-01 | End: 2018-01-01

## 2018-01-01 RX ORDER — PANTOPRAZOLE SODIUM 20 MG/1
40 TABLET, DELAYED RELEASE ORAL
Qty: 0 | Refills: 0 | Status: DISCONTINUED | OUTPATIENT
Start: 2018-01-01 | End: 2018-01-01

## 2018-01-01 RX ORDER — MAGNESIUM SULFATE 500 MG/ML
2 VIAL (ML) INJECTION ONCE
Qty: 0 | Refills: 0 | Status: DISCONTINUED | OUTPATIENT
Start: 2018-01-01 | End: 2018-01-01

## 2018-01-01 RX ORDER — VANCOMYCIN HCL 1 G
500 VIAL (EA) INTRAVENOUS EVERY 12 HOURS
Qty: 0 | Refills: 0 | Status: DISCONTINUED | OUTPATIENT
Start: 2018-01-01 | End: 2018-01-01

## 2018-01-01 RX ORDER — ALBUTEROL 90 UG/1
2.5 AEROSOL, METERED ORAL EVERY 6 HOURS
Qty: 0 | Refills: 0 | Status: DISCONTINUED | OUTPATIENT
Start: 2018-01-01 | End: 2018-01-01

## 2018-01-01 RX ORDER — NOREPINEPHRINE BITARTRATE/D5W 8 MG/250ML
0.01 PLASTIC BAG, INJECTION (ML) INTRAVENOUS
Qty: 8 | Refills: 0 | Status: DISCONTINUED | OUTPATIENT
Start: 2018-01-01 | End: 2018-01-01

## 2018-01-01 RX ORDER — SODIUM CHLORIDE 9 MG/ML
250 INJECTION INTRAMUSCULAR; INTRAVENOUS; SUBCUTANEOUS ONCE
Qty: 0 | Refills: 0 | Status: COMPLETED | OUTPATIENT
Start: 2018-01-01 | End: 2018-01-01

## 2018-01-01 RX ORDER — ONDANSETRON 8 MG/1
4 TABLET, FILM COATED ORAL EVERY 6 HOURS
Qty: 0 | Refills: 0 | Status: DISCONTINUED | OUTPATIENT
Start: 2018-01-01 | End: 2018-01-01

## 2018-01-01 RX ORDER — ALBUMIN HUMAN 25 %
250 VIAL (ML) INTRAVENOUS ONCE
Qty: 0 | Refills: 0 | Status: DISCONTINUED | OUTPATIENT
Start: 2018-01-01 | End: 2018-01-01

## 2018-01-01 RX ORDER — METOCLOPRAMIDE HCL 10 MG
10 TABLET ORAL EVERY 6 HOURS
Qty: 0 | Refills: 0 | Status: DISCONTINUED | OUTPATIENT
Start: 2018-01-01 | End: 2018-01-01

## 2018-01-01 RX ORDER — BUMETANIDE 0.25 MG/ML
0.5 INJECTION INTRAMUSCULAR; INTRAVENOUS EVERY 12 HOURS
Qty: 0 | Refills: 0 | Status: DISCONTINUED | OUTPATIENT
Start: 2018-01-01 | End: 2018-01-01

## 2018-01-01 RX ORDER — ALBUTEROL 90 UG/1
2.5 AEROSOL, METERED ORAL ONCE
Qty: 0 | Refills: 0 | Status: COMPLETED | OUTPATIENT
Start: 2018-01-01 | End: 2018-01-01

## 2018-01-01 RX ORDER — DICLOFENAC SODIUM 75 MG/1
0 TABLET, DELAYED RELEASE ORAL
Qty: 0 | Refills: 0 | COMMUNITY

## 2018-01-01 RX ORDER — TETANUS TOXOID, REDUCED DIPHTHERIA TOXOID AND ACELLULAR PERTUSSIS VACCINE, ADSORBED 5; 2.5; 8; 8; 2.5 [IU]/.5ML; [IU]/.5ML; UG/.5ML; UG/.5ML; UG/.5ML
0.5 SUSPENSION INTRAMUSCULAR ONCE
Qty: 0 | Refills: 0 | Status: COMPLETED | OUTPATIENT
Start: 2018-01-01 | End: 2018-01-01

## 2018-01-01 RX ORDER — MAGNESIUM SULFATE 500 MG/ML
2 VIAL (ML) INJECTION ONCE
Qty: 0 | Refills: 0 | Status: COMPLETED | OUTPATIENT
Start: 2018-01-01 | End: 2018-01-01

## 2018-01-01 RX ORDER — POTASSIUM CHLORIDE 20 MEQ
20 PACKET (EA) ORAL ONCE
Qty: 0 | Refills: 0 | Status: COMPLETED | OUTPATIENT
Start: 2018-01-01 | End: 2018-01-01

## 2018-01-01 RX ORDER — POTASSIUM CHLORIDE 20 MEQ
20 PACKET (EA) ORAL
Qty: 0 | Refills: 0 | Status: COMPLETED | OUTPATIENT
Start: 2018-01-01 | End: 2018-01-01

## 2018-01-01 RX ORDER — INSULIN LISPRO 100/ML
VIAL (ML) SUBCUTANEOUS EVERY 6 HOURS
Qty: 0 | Refills: 0 | Status: DISCONTINUED | OUTPATIENT
Start: 2018-01-01 | End: 2018-01-01

## 2018-01-01 RX ORDER — HYDROMORPHONE HYDROCHLORIDE 2 MG/ML
0.5 INJECTION INTRAMUSCULAR; INTRAVENOUS; SUBCUTANEOUS
Qty: 0 | Refills: 0 | Status: DISCONTINUED | OUTPATIENT
Start: 2018-01-01 | End: 2018-01-01

## 2018-01-01 RX ORDER — BUMETANIDE 0.25 MG/ML
1 INJECTION INTRAMUSCULAR; INTRAVENOUS EVERY 12 HOURS
Qty: 0 | Refills: 0 | Status: DISCONTINUED | OUTPATIENT
Start: 2018-01-01 | End: 2018-01-01

## 2018-01-01 RX ORDER — SODIUM CHLORIDE 9 MG/ML
1000 INJECTION, SOLUTION INTRAVENOUS
Qty: 0 | Refills: 0 | Status: DISCONTINUED | OUTPATIENT
Start: 2018-01-01 | End: 2018-01-01

## 2018-01-01 RX ORDER — METOPROLOL TARTRATE 50 MG
25 TABLET ORAL
Qty: 0 | Refills: 0 | Status: DISCONTINUED | OUTPATIENT
Start: 2018-01-01 | End: 2018-01-01

## 2018-01-01 RX ORDER — SODIUM CHLORIDE 9 MG/ML
1000 INJECTION INTRAMUSCULAR; INTRAVENOUS; SUBCUTANEOUS ONCE
Qty: 0 | Refills: 0 | Status: COMPLETED | OUTPATIENT
Start: 2018-01-01 | End: 2018-01-01

## 2018-01-01 RX ORDER — FUROSEMIDE 40 MG
40 TABLET ORAL ONCE
Qty: 0 | Refills: 0 | Status: COMPLETED | OUTPATIENT
Start: 2018-01-01 | End: 2018-01-01

## 2018-01-01 RX ORDER — METFORMIN HYDROCHLORIDE 850 MG/1
1 TABLET ORAL
Qty: 0 | Refills: 0 | COMMUNITY

## 2018-01-01 RX ORDER — HYDROMORPHONE HYDROCHLORIDE 2 MG/ML
0.5 INJECTION INTRAMUSCULAR; INTRAVENOUS; SUBCUTANEOUS EVERY 6 HOURS
Qty: 0 | Refills: 0 | Status: DISCONTINUED | OUTPATIENT
Start: 2018-01-01 | End: 2018-01-01

## 2018-01-01 RX ORDER — POTASSIUM CHLORIDE 20 MEQ
40 PACKET (EA) ORAL EVERY 4 HOURS
Qty: 0 | Refills: 0 | Status: COMPLETED | OUTPATIENT
Start: 2018-01-01 | End: 2018-01-01

## 2018-01-01 RX ORDER — METOPROLOL TARTRATE 50 MG
25 TABLET ORAL DAILY
Qty: 0 | Refills: 0 | Status: DISCONTINUED | OUTPATIENT
Start: 2018-01-01 | End: 2018-01-01

## 2018-01-01 RX ORDER — PIPERACILLIN AND TAZOBACTAM 4; .5 G/20ML; G/20ML
3.38 INJECTION, POWDER, LYOPHILIZED, FOR SOLUTION INTRAVENOUS EVERY 8 HOURS
Qty: 0 | Refills: 0 | Status: DISCONTINUED | OUTPATIENT
Start: 2018-01-01 | End: 2018-01-01

## 2018-01-01 RX ORDER — POTASSIUM CHLORIDE 20 MEQ
40 PACKET (EA) ORAL ONCE
Qty: 0 | Refills: 0 | Status: COMPLETED | OUTPATIENT
Start: 2018-01-01 | End: 2018-01-01

## 2018-01-01 RX ORDER — POTASSIUM CHLORIDE 20 MEQ
10 PACKET (EA) ORAL ONCE
Qty: 0 | Refills: 0 | Status: DISCONTINUED | OUTPATIENT
Start: 2018-01-01 | End: 2018-01-01

## 2018-01-01 RX ORDER — AMLODIPINE BESYLATE 2.5 MG/1
10 TABLET ORAL
Qty: 0 | Refills: 0 | COMMUNITY

## 2018-01-01 RX ORDER — DEXTROSE 50 % IN WATER 50 %
50 SYRINGE (ML) INTRAVENOUS
Qty: 0 | Refills: 0 | Status: DISCONTINUED | OUTPATIENT
Start: 2018-01-01 | End: 2018-01-01

## 2018-01-01 RX ORDER — BUMETANIDE 0.25 MG/ML
0.5 INJECTION INTRAMUSCULAR; INTRAVENOUS
Qty: 10 | Refills: 0 | Status: DISCONTINUED | OUTPATIENT
Start: 2018-01-01 | End: 2018-01-01

## 2018-01-01 RX ORDER — PANTOPRAZOLE SODIUM 20 MG/1
8 TABLET, DELAYED RELEASE ORAL
Qty: 80 | Refills: 0 | Status: DISCONTINUED | OUTPATIENT
Start: 2018-01-01 | End: 2018-01-01

## 2018-01-01 RX ORDER — MAGNESIUM SULFATE 500 MG/ML
2 VIAL (ML) INJECTION
Qty: 0 | Refills: 0 | Status: COMPLETED | OUTPATIENT
Start: 2018-01-01 | End: 2018-01-01

## 2018-01-01 RX ORDER — LOSARTAN POTASSIUM 100 MG/1
100 TABLET, FILM COATED ORAL
Qty: 0 | Refills: 0 | COMMUNITY

## 2018-01-01 RX ORDER — ACETAMINOPHEN 500 MG
650 TABLET ORAL EVERY 6 HOURS
Qty: 0 | Refills: 0 | Status: DISCONTINUED | OUTPATIENT
Start: 2018-01-01 | End: 2018-01-01

## 2018-01-01 RX ORDER — SODIUM CHLORIDE 9 MG/ML
1000 INJECTION INTRAMUSCULAR; INTRAVENOUS; SUBCUTANEOUS
Qty: 0 | Refills: 0 | Status: DISCONTINUED | OUTPATIENT
Start: 2018-01-01 | End: 2018-01-01

## 2018-01-01 RX ORDER — POTASSIUM CHLORIDE 20 MEQ
40 PACKET (EA) ORAL DAILY
Qty: 0 | Refills: 0 | Status: DISCONTINUED | OUTPATIENT
Start: 2018-01-01 | End: 2018-01-01

## 2018-01-01 RX ORDER — FUROSEMIDE 40 MG
20 TABLET ORAL ONCE
Qty: 0 | Refills: 0 | Status: COMPLETED | OUTPATIENT
Start: 2018-01-01 | End: 2018-01-01

## 2018-01-01 RX ORDER — VANCOMYCIN HCL 1 G
750 VIAL (EA) INTRAVENOUS EVERY 12 HOURS
Qty: 0 | Refills: 0 | Status: DISCONTINUED | OUTPATIENT
Start: 2018-01-01 | End: 2018-01-01

## 2018-01-01 RX ORDER — EPINEPHRINE 0.3 MG/.3ML
0.5 INJECTION INTRAMUSCULAR; SUBCUTANEOUS
Qty: 4 | Refills: 0 | Status: DISCONTINUED | OUTPATIENT
Start: 2018-01-01 | End: 2018-01-01

## 2018-01-01 RX ORDER — SODIUM CHLORIDE 9 MG/ML
150 INJECTION INTRAMUSCULAR; INTRAVENOUS; SUBCUTANEOUS ONCE
Qty: 0 | Refills: 0 | Status: COMPLETED | OUTPATIENT
Start: 2018-01-01 | End: 2018-01-01

## 2018-01-01 RX ORDER — METOPROLOL TARTRATE 50 MG
1 TABLET ORAL
Qty: 0 | Refills: 0 | COMMUNITY

## 2018-01-01 RX ORDER — POTASSIUM CHLORIDE 20 MEQ
40 PACKET (EA) ORAL EVERY 4 HOURS
Qty: 0 | Refills: 0 | Status: DISCONTINUED | OUTPATIENT
Start: 2018-01-01 | End: 2018-01-01

## 2018-01-01 RX ORDER — VASOPRESSIN 20 [USP'U]/ML
0.04 INJECTION INTRAVENOUS
Qty: 100 | Refills: 0 | Status: DISCONTINUED | OUTPATIENT
Start: 2018-01-01 | End: 2018-01-01

## 2018-01-01 RX ORDER — PANTOPRAZOLE SODIUM 20 MG/1
80 TABLET, DELAYED RELEASE ORAL ONCE
Qty: 0 | Refills: 0 | Status: COMPLETED | OUTPATIENT
Start: 2018-01-01 | End: 2018-01-01

## 2018-01-01 RX ORDER — ROBINUL 0.2 MG/ML
0.1 INJECTION INTRAMUSCULAR; INTRAVENOUS EVERY 6 HOURS
Qty: 0 | Refills: 0 | Status: DISCONTINUED | OUTPATIENT
Start: 2018-01-01 | End: 2018-01-01

## 2018-01-01 RX ORDER — VANCOMYCIN HCL 1 G
750 VIAL (EA) INTRAVENOUS EVERY 24 HOURS
Qty: 0 | Refills: 0 | Status: DISCONTINUED | OUTPATIENT
Start: 2018-01-01 | End: 2018-01-01

## 2018-01-01 RX ORDER — VANCOMYCIN HCL 1 G
750 VIAL (EA) INTRAVENOUS ONCE
Qty: 0 | Refills: 0 | Status: COMPLETED | OUTPATIENT
Start: 2018-01-01 | End: 2018-01-01

## 2018-01-01 RX ORDER — FUROSEMIDE 40 MG
40 TABLET ORAL EVERY 12 HOURS
Qty: 0 | Refills: 0 | Status: DISCONTINUED | OUTPATIENT
Start: 2018-01-01 | End: 2018-01-01

## 2018-01-01 RX ORDER — SODIUM CHLORIDE 9 MG/ML
100 INJECTION, SOLUTION INTRAVENOUS
Qty: 0 | Refills: 0 | Status: DISCONTINUED | OUTPATIENT
Start: 2018-01-01 | End: 2018-01-01

## 2018-01-01 RX ORDER — DEXTROSE 50 % IN WATER 50 %
12.5 SYRINGE (ML) INTRAVENOUS ONCE
Qty: 0 | Refills: 0 | Status: DISCONTINUED | OUTPATIENT
Start: 2018-01-01 | End: 2018-01-01

## 2018-01-01 RX ORDER — FENTANYL CITRATE 50 UG/ML
50 INJECTION INTRAVENOUS
Qty: 0 | Refills: 0 | Status: DISCONTINUED | OUTPATIENT
Start: 2018-01-01 | End: 2018-01-01

## 2018-01-01 RX ORDER — SODIUM CHLORIDE 9 MG/ML
3 INJECTION INTRAMUSCULAR; INTRAVENOUS; SUBCUTANEOUS EVERY 8 HOURS
Qty: 0 | Refills: 0 | Status: DISCONTINUED | OUTPATIENT
Start: 2018-01-01 | End: 2018-01-01

## 2018-01-01 RX ADMIN — BUMETANIDE 5 MG/HR: 0.25 INJECTION INTRAMUSCULAR; INTRAVENOUS at 17:07

## 2018-01-01 RX ADMIN — Medication 25 MILLIGRAM(S): at 05:26

## 2018-01-01 RX ADMIN — Medication 25 MILLIGRAM(S): at 05:29

## 2018-01-01 RX ADMIN — Medication 20 MILLIEQUIVALENT(S): at 13:13

## 2018-01-01 RX ADMIN — ALBUTEROL 2.5 MILLIGRAM(S): 90 AEROSOL, METERED ORAL at 20:37

## 2018-01-01 RX ADMIN — SODIUM CHLORIDE 3 MILLILITER(S): 9 INJECTION INTRAMUSCULAR; INTRAVENOUS; SUBCUTANEOUS at 15:41

## 2018-01-01 RX ADMIN — ALBUTEROL 2.5 MILLIGRAM(S): 90 AEROSOL, METERED ORAL at 08:47

## 2018-01-01 RX ADMIN — SODIUM CHLORIDE 3 MILLILITER(S): 9 INJECTION INTRAMUSCULAR; INTRAVENOUS; SUBCUTANEOUS at 21:03

## 2018-01-01 RX ADMIN — PANTOPRAZOLE SODIUM 10 MG/HR: 20 TABLET, DELAYED RELEASE ORAL at 05:47

## 2018-01-01 RX ADMIN — Medication 25 MILLIGRAM(S): at 18:33

## 2018-01-01 RX ADMIN — PIPERACILLIN AND TAZOBACTAM 25 GRAM(S): 4; .5 INJECTION, POWDER, LYOPHILIZED, FOR SOLUTION INTRAVENOUS at 21:08

## 2018-01-01 RX ADMIN — Medication 4: at 18:30

## 2018-01-01 RX ADMIN — PANTOPRAZOLE SODIUM 40 MILLIGRAM(S): 20 TABLET, DELAYED RELEASE ORAL at 05:24

## 2018-01-01 RX ADMIN — SODIUM CHLORIDE 100 MILLILITER(S): 9 INJECTION INTRAMUSCULAR; INTRAVENOUS; SUBCUTANEOUS at 02:00

## 2018-01-01 RX ADMIN — Medication 10 MILLIGRAM(S): at 22:50

## 2018-01-01 RX ADMIN — PANTOPRAZOLE SODIUM 10 MG/HR: 20 TABLET, DELAYED RELEASE ORAL at 23:17

## 2018-01-01 RX ADMIN — TETANUS TOXOID, REDUCED DIPHTHERIA TOXOID AND ACELLULAR PERTUSSIS VACCINE, ADSORBED 0.5 MILLILITER(S): 5; 2.5; 8; 8; 2.5 SUSPENSION INTRAMUSCULAR at 01:42

## 2018-01-01 RX ADMIN — SODIUM CHLORIDE 2000 MILLILITER(S): 9 INJECTION INTRAMUSCULAR; INTRAVENOUS; SUBCUTANEOUS at 20:00

## 2018-01-01 RX ADMIN — Medication 50 GRAM(S): at 11:52

## 2018-01-01 RX ADMIN — Medication 100 MILLIGRAM(S): at 05:24

## 2018-01-01 RX ADMIN — SODIUM CHLORIDE 3 MILLILITER(S): 9 INJECTION INTRAMUSCULAR; INTRAVENOUS; SUBCUTANEOUS at 06:34

## 2018-01-01 RX ADMIN — VASOPRESSIN 2.4 UNIT(S)/MIN: 20 INJECTION INTRAVENOUS at 05:42

## 2018-01-01 RX ADMIN — Medication 40 MILLIEQUIVALENT(S): at 13:08

## 2018-01-01 RX ADMIN — Medication 50 GRAM(S): at 05:09

## 2018-01-01 RX ADMIN — Medication 10 MILLIGRAM(S): at 05:44

## 2018-01-01 RX ADMIN — SODIUM CHLORIDE 3 MILLILITER(S): 9 INJECTION INTRAMUSCULAR; INTRAVENOUS; SUBCUTANEOUS at 05:29

## 2018-01-01 RX ADMIN — Medication 150 MILLIGRAM(S): at 08:32

## 2018-01-01 RX ADMIN — Medication 25 MILLIGRAM(S): at 05:14

## 2018-01-01 RX ADMIN — SODIUM CHLORIDE 3 MILLILITER(S): 9 INJECTION INTRAMUSCULAR; INTRAVENOUS; SUBCUTANEOUS at 11:53

## 2018-01-01 RX ADMIN — Medication 150 MILLIGRAM(S): at 05:44

## 2018-01-01 RX ADMIN — PANTOPRAZOLE SODIUM 10 MG/HR: 20 TABLET, DELAYED RELEASE ORAL at 01:50

## 2018-01-01 RX ADMIN — ALBUTEROL 2.5 MILLIGRAM(S): 90 AEROSOL, METERED ORAL at 15:11

## 2018-01-01 RX ADMIN — EPINEPHRINE 117.38 MICROGRAM(S)/KG/MIN: 0.3 INJECTION INTRAMUSCULAR; SUBCUTANEOUS at 04:30

## 2018-01-01 RX ADMIN — ALBUTEROL 2.5 MILLIGRAM(S): 90 AEROSOL, METERED ORAL at 02:55

## 2018-01-01 RX ADMIN — BUMETANIDE 5 MG/HR: 0.25 INJECTION INTRAMUSCULAR; INTRAVENOUS at 22:51

## 2018-01-01 RX ADMIN — ALBUTEROL 2.5 MILLIGRAM(S): 90 AEROSOL, METERED ORAL at 20:59

## 2018-01-01 RX ADMIN — PIPERACILLIN AND TAZOBACTAM 25 GRAM(S): 4; .5 INJECTION, POWDER, LYOPHILIZED, FOR SOLUTION INTRAVENOUS at 14:19

## 2018-01-01 RX ADMIN — Medication 2 MILLIGRAM(S): at 21:25

## 2018-01-01 RX ADMIN — Medication 25 MILLIGRAM(S): at 06:24

## 2018-01-01 RX ADMIN — BUMETANIDE 5 MG/HR: 0.25 INJECTION INTRAMUSCULAR; INTRAVENOUS at 21:33

## 2018-01-01 RX ADMIN — SODIUM CHLORIDE 300 MILLILITER(S): 9 INJECTION INTRAMUSCULAR; INTRAVENOUS; SUBCUTANEOUS at 11:15

## 2018-01-01 RX ADMIN — ALBUTEROL 2.5 MILLIGRAM(S): 90 AEROSOL, METERED ORAL at 08:08

## 2018-01-01 RX ADMIN — Medication 10 MILLIGRAM(S): at 11:58

## 2018-01-01 RX ADMIN — Medication 40 MILLIEQUIVALENT(S): at 12:21

## 2018-01-01 RX ADMIN — PIPERACILLIN AND TAZOBACTAM 25 GRAM(S): 4; .5 INJECTION, POWDER, LYOPHILIZED, FOR SOLUTION INTRAVENOUS at 05:29

## 2018-01-01 RX ADMIN — PIPERACILLIN AND TAZOBACTAM 25 GRAM(S): 4; .5 INJECTION, POWDER, LYOPHILIZED, FOR SOLUTION INTRAVENOUS at 23:33

## 2018-01-01 RX ADMIN — PIPERACILLIN AND TAZOBACTAM 25 GRAM(S): 4; .5 INJECTION, POWDER, LYOPHILIZED, FOR SOLUTION INTRAVENOUS at 05:24

## 2018-01-01 RX ADMIN — Medication 40 MILLIGRAM(S): at 16:30

## 2018-01-01 RX ADMIN — FENTANYL CITRATE 50 MICROGRAM(S): 50 INJECTION INTRAVENOUS at 09:34

## 2018-01-01 RX ADMIN — PIPERACILLIN AND TAZOBACTAM 25 GRAM(S): 4; .5 INJECTION, POWDER, LYOPHILIZED, FOR SOLUTION INTRAVENOUS at 15:59

## 2018-01-01 RX ADMIN — Medication 2: at 13:01

## 2018-01-01 RX ADMIN — Medication 40 MILLIEQUIVALENT(S): at 09:00

## 2018-01-01 RX ADMIN — SODIUM CHLORIDE 3 MILLILITER(S): 9 INJECTION INTRAMUSCULAR; INTRAVENOUS; SUBCUTANEOUS at 05:07

## 2018-01-01 RX ADMIN — Medication 2: at 23:37

## 2018-01-01 RX ADMIN — Medication 2: at 06:28

## 2018-01-01 RX ADMIN — Medication 25 MILLIGRAM(S): at 15:59

## 2018-01-01 RX ADMIN — PIPERACILLIN AND TAZOBACTAM 25 GRAM(S): 4; .5 INJECTION, POWDER, LYOPHILIZED, FOR SOLUTION INTRAVENOUS at 14:30

## 2018-01-01 RX ADMIN — VASOPRESSIN 2.4 UNIT(S)/MIN: 20 INJECTION INTRAVENOUS at 12:05

## 2018-01-01 RX ADMIN — PANTOPRAZOLE SODIUM 10 MG/HR: 20 TABLET, DELAYED RELEASE ORAL at 11:52

## 2018-01-01 RX ADMIN — PANTOPRAZOLE SODIUM 40 MILLIGRAM(S): 20 TABLET, DELAYED RELEASE ORAL at 21:07

## 2018-01-01 RX ADMIN — PANTOPRAZOLE SODIUM 10 MG/HR: 20 TABLET, DELAYED RELEASE ORAL at 22:51

## 2018-01-01 RX ADMIN — SODIUM CHLORIDE 3 MILLILITER(S): 9 INJECTION INTRAMUSCULAR; INTRAVENOUS; SUBCUTANEOUS at 13:29

## 2018-01-01 RX ADMIN — SODIUM CHLORIDE 3 MILLILITER(S): 9 INJECTION INTRAMUSCULAR; INTRAVENOUS; SUBCUTANEOUS at 22:41

## 2018-01-01 RX ADMIN — Medication 40 MILLIEQUIVALENT(S): at 15:42

## 2018-01-01 RX ADMIN — PIPERACILLIN AND TAZOBACTAM 25 GRAM(S): 4; .5 INJECTION, POWDER, LYOPHILIZED, FOR SOLUTION INTRAVENOUS at 06:24

## 2018-01-01 RX ADMIN — ALBUTEROL 2.5 MILLIGRAM(S): 90 AEROSOL, METERED ORAL at 02:57

## 2018-01-01 RX ADMIN — Medication 650 MILLIGRAM(S): at 18:47

## 2018-01-01 RX ADMIN — PANTOPRAZOLE SODIUM 10 MG/HR: 20 TABLET, DELAYED RELEASE ORAL at 14:19

## 2018-01-01 RX ADMIN — Medication 100 MILLIGRAM(S): at 21:32

## 2018-01-01 RX ADMIN — Medication 20 MILLIEQUIVALENT(S): at 16:28

## 2018-01-01 RX ADMIN — Medication 2: at 18:44

## 2018-01-01 RX ADMIN — Medication 200 GRAM(S): at 23:17

## 2018-01-01 RX ADMIN — Medication 40 MILLIEQUIVALENT(S): at 13:01

## 2018-01-01 RX ADMIN — Medication 25 MILLIGRAM(S): at 18:17

## 2018-01-01 RX ADMIN — LISINOPRIL 2.5 MILLIGRAM(S): 2.5 TABLET ORAL at 05:24

## 2018-01-01 RX ADMIN — PANTOPRAZOLE SODIUM 10 MG/HR: 20 TABLET, DELAYED RELEASE ORAL at 23:35

## 2018-01-01 RX ADMIN — Medication 150 MILLIGRAM(S): at 05:14

## 2018-01-01 RX ADMIN — ALBUTEROL 2.5 MILLIGRAM(S): 90 AEROSOL, METERED ORAL at 20:28

## 2018-01-01 RX ADMIN — PIPERACILLIN AND TAZOBACTAM 25 GRAM(S): 4; .5 INJECTION, POWDER, LYOPHILIZED, FOR SOLUTION INTRAVENOUS at 06:38

## 2018-01-01 RX ADMIN — ALBUTEROL 2.5 MILLIGRAM(S): 90 AEROSOL, METERED ORAL at 09:06

## 2018-01-01 RX ADMIN — Medication 150 MILLIGRAM(S): at 18:29

## 2018-01-01 RX ADMIN — SODIUM CHLORIDE 2000 MILLILITER(S): 9 INJECTION INTRAMUSCULAR; INTRAVENOUS; SUBCUTANEOUS at 21:00

## 2018-01-01 RX ADMIN — PIPERACILLIN AND TAZOBACTAM 25 GRAM(S): 4; .5 INJECTION, POWDER, LYOPHILIZED, FOR SOLUTION INTRAVENOUS at 22:51

## 2018-01-01 RX ADMIN — PANTOPRAZOLE SODIUM 10 MG/HR: 20 TABLET, DELAYED RELEASE ORAL at 03:00

## 2018-01-01 RX ADMIN — SODIUM CHLORIDE 3 MILLILITER(S): 9 INJECTION INTRAMUSCULAR; INTRAVENOUS; SUBCUTANEOUS at 21:23

## 2018-01-01 RX ADMIN — SODIUM CHLORIDE 3 MILLILITER(S): 9 INJECTION INTRAMUSCULAR; INTRAVENOUS; SUBCUTANEOUS at 13:08

## 2018-01-01 RX ADMIN — Medication 2 MILLIGRAM(S): at 13:00

## 2018-01-01 RX ADMIN — LISINOPRIL 2.5 MILLIGRAM(S): 2.5 TABLET ORAL at 22:51

## 2018-01-01 RX ADMIN — Medication 4: at 01:43

## 2018-01-01 RX ADMIN — ALBUTEROL 2.5 MILLIGRAM(S): 90 AEROSOL, METERED ORAL at 03:24

## 2018-01-01 RX ADMIN — PANTOPRAZOLE SODIUM 80 MILLIGRAM(S): 20 TABLET, DELAYED RELEASE ORAL at 01:43

## 2018-01-01 RX ADMIN — Medication 20 MILLIGRAM(S): at 16:28

## 2018-01-01 RX ADMIN — PIPERACILLIN AND TAZOBACTAM 25 GRAM(S): 4; .5 INJECTION, POWDER, LYOPHILIZED, FOR SOLUTION INTRAVENOUS at 15:00

## 2018-01-01 RX ADMIN — Medication 1.17 MICROGRAM(S)/KG/MIN: at 12:05

## 2018-01-01 RX ADMIN — ALBUTEROL 2.5 MILLIGRAM(S): 90 AEROSOL, METERED ORAL at 02:18

## 2018-01-01 RX ADMIN — SODIUM CHLORIDE 3 MILLILITER(S): 9 INJECTION INTRAMUSCULAR; INTRAVENOUS; SUBCUTANEOUS at 05:25

## 2018-01-01 RX ADMIN — Medication 100 MILLIGRAM(S): at 18:44

## 2018-01-01 RX ADMIN — Medication 40 MILLIEQUIVALENT(S): at 17:22

## 2018-01-01 RX ADMIN — Medication 50 GRAM(S): at 02:30

## 2018-01-01 RX ADMIN — ALBUTEROL 2.5 MILLIGRAM(S): 90 AEROSOL, METERED ORAL at 16:53

## 2018-01-01 RX ADMIN — SODIUM CHLORIDE 3 MILLILITER(S): 9 INJECTION INTRAMUSCULAR; INTRAVENOUS; SUBCUTANEOUS at 22:50

## 2018-01-01 RX ADMIN — SODIUM CHLORIDE 100 MILLILITER(S): 9 INJECTION INTRAMUSCULAR; INTRAVENOUS; SUBCUTANEOUS at 23:36

## 2018-01-01 RX ADMIN — PIPERACILLIN AND TAZOBACTAM 25 GRAM(S): 4; .5 INJECTION, POWDER, LYOPHILIZED, FOR SOLUTION INTRAVENOUS at 21:32

## 2018-01-01 RX ADMIN — HYDROMORPHONE HYDROCHLORIDE 0.5 MILLIGRAM(S): 2 INJECTION INTRAMUSCULAR; INTRAVENOUS; SUBCUTANEOUS at 13:00

## 2018-01-01 RX ADMIN — SODIUM CHLORIDE 3 MILLILITER(S): 9 INJECTION INTRAMUSCULAR; INTRAVENOUS; SUBCUTANEOUS at 14:14

## 2018-01-01 RX ADMIN — SODIUM CHLORIDE 3 MILLILITER(S): 9 INJECTION INTRAMUSCULAR; INTRAVENOUS; SUBCUTANEOUS at 22:06

## 2018-01-01 RX ADMIN — LISINOPRIL 2.5 MILLIGRAM(S): 2.5 TABLET ORAL at 05:29

## 2018-01-01 RX ADMIN — PANTOPRAZOLE SODIUM 40 MILLIGRAM(S): 20 TABLET, DELAYED RELEASE ORAL at 05:29

## 2018-01-01 RX ADMIN — Medication 2: at 06:04

## 2018-01-01 RX ADMIN — PANTOPRAZOLE SODIUM 10 MG/HR: 20 TABLET, DELAYED RELEASE ORAL at 12:05

## 2018-01-01 RX ADMIN — Medication 20 MILLIEQUIVALENT(S): at 18:17

## 2018-01-01 RX ADMIN — SODIUM CHLORIDE 3 MILLILITER(S): 9 INJECTION INTRAMUSCULAR; INTRAVENOUS; SUBCUTANEOUS at 06:13

## 2018-01-01 RX ADMIN — Medication 40 MILLIGRAM(S): at 11:26

## 2018-01-01 RX ADMIN — Medication 25 MILLIGRAM(S): at 18:28

## 2018-01-01 RX ADMIN — Medication 1.17 MICROGRAM(S)/KG/MIN: at 05:42

## 2018-01-01 RX ADMIN — Medication 50 MILLIEQUIVALENT(S): at 05:15

## 2018-01-01 RX ADMIN — Medication 50 GRAM(S): at 09:00

## 2018-01-01 RX ADMIN — Medication 4: at 12:38

## 2018-01-01 RX ADMIN — ALBUTEROL 2.5 MILLIGRAM(S): 90 AEROSOL, METERED ORAL at 09:43

## 2018-01-01 RX ADMIN — PANTOPRAZOLE SODIUM 10 MG/HR: 20 TABLET, DELAYED RELEASE ORAL at 18:28

## 2018-01-01 RX ADMIN — PIPERACILLIN AND TAZOBACTAM 25 GRAM(S): 4; .5 INJECTION, POWDER, LYOPHILIZED, FOR SOLUTION INTRAVENOUS at 05:42

## 2018-01-01 RX ADMIN — PIPERACILLIN AND TAZOBACTAM 25 GRAM(S): 4; .5 INJECTION, POWDER, LYOPHILIZED, FOR SOLUTION INTRAVENOUS at 05:14

## 2018-01-01 RX ADMIN — PANTOPRAZOLE SODIUM 40 MILLIGRAM(S): 20 TABLET, DELAYED RELEASE ORAL at 18:47

## 2018-01-01 RX ADMIN — SODIUM CHLORIDE 1000 MILLILITER(S): 9 INJECTION INTRAMUSCULAR; INTRAVENOUS; SUBCUTANEOUS at 10:30

## 2018-01-01 RX ADMIN — PANTOPRAZOLE SODIUM 40 MILLIGRAM(S): 20 TABLET, DELAYED RELEASE ORAL at 05:14

## 2018-01-01 RX ADMIN — PANTOPRAZOLE SODIUM 10 MG/HR: 20 TABLET, DELAYED RELEASE ORAL at 13:28

## 2018-01-01 RX ADMIN — Medication 40 MILLIEQUIVALENT(S): at 12:05

## 2018-01-01 RX ADMIN — ALBUTEROL 2.5 MILLIGRAM(S): 90 AEROSOL, METERED ORAL at 14:29

## 2018-01-01 RX ADMIN — Medication 150 MILLIGRAM(S): at 19:56

## 2018-01-01 RX ADMIN — PIPERACILLIN AND TAZOBACTAM 25 GRAM(S): 4; .5 INJECTION, POWDER, LYOPHILIZED, FOR SOLUTION INTRAVENOUS at 23:18

## 2018-01-01 RX ADMIN — SODIUM CHLORIDE 3 MILLILITER(S): 9 INJECTION INTRAMUSCULAR; INTRAVENOUS; SUBCUTANEOUS at 13:51

## 2018-01-01 RX ADMIN — Medication 2: at 11:26

## 2018-01-01 RX ADMIN — SODIUM CHLORIDE 3 MILLILITER(S): 9 INJECTION INTRAMUSCULAR; INTRAVENOUS; SUBCUTANEOUS at 05:26

## 2018-01-01 RX ADMIN — Medication 1.17 MICROGRAM(S)/KG/MIN: at 22:44

## 2018-01-01 RX ADMIN — Medication 25 MILLIGRAM(S): at 05:11

## 2018-01-01 RX ADMIN — SODIUM CHLORIDE 3 MILLILITER(S): 9 INJECTION INTRAMUSCULAR; INTRAVENOUS; SUBCUTANEOUS at 05:15

## 2018-01-01 RX ADMIN — SODIUM CHLORIDE 3 MILLILITER(S): 9 INJECTION INTRAMUSCULAR; INTRAVENOUS; SUBCUTANEOUS at 15:01

## 2018-01-01 RX ADMIN — SODIUM CHLORIDE 3 MILLILITER(S): 9 INJECTION INTRAMUSCULAR; INTRAVENOUS; SUBCUTANEOUS at 14:19

## 2018-01-01 RX ADMIN — SODIUM CHLORIDE 3 MILLILITER(S): 9 INJECTION INTRAMUSCULAR; INTRAVENOUS; SUBCUTANEOUS at 21:01

## 2018-01-01 RX ADMIN — PIPERACILLIN AND TAZOBACTAM 25 GRAM(S): 4; .5 INJECTION, POWDER, LYOPHILIZED, FOR SOLUTION INTRAVENOUS at 14:15

## 2018-01-01 RX ADMIN — PANTOPRAZOLE SODIUM 40 MILLIGRAM(S): 20 TABLET, DELAYED RELEASE ORAL at 19:14

## 2018-01-01 RX ADMIN — FENTANYL CITRATE 50 MICROGRAM(S): 50 INJECTION INTRAVENOUS at 10:00

## 2018-01-01 RX ADMIN — PIPERACILLIN AND TAZOBACTAM 25 GRAM(S): 4; .5 INJECTION, POWDER, LYOPHILIZED, FOR SOLUTION INTRAVENOUS at 22:42

## 2018-01-01 RX ADMIN — ALBUTEROL 2.5 MILLIGRAM(S): 90 AEROSOL, METERED ORAL at 02:48

## 2018-01-01 RX ADMIN — Medication 50 MILLILITER(S): at 12:05

## 2018-01-01 RX ADMIN — PIPERACILLIN AND TAZOBACTAM 25 GRAM(S): 4; .5 INJECTION, POWDER, LYOPHILIZED, FOR SOLUTION INTRAVENOUS at 13:08

## 2018-01-01 RX ADMIN — SODIUM CHLORIDE 3 MILLILITER(S): 9 INJECTION INTRAMUSCULAR; INTRAVENOUS; SUBCUTANEOUS at 22:36

## 2018-01-01 RX ADMIN — BUMETANIDE 0.5 MILLIGRAM(S): 0.25 INJECTION INTRAMUSCULAR; INTRAVENOUS at 05:14

## 2018-01-01 RX ADMIN — PANTOPRAZOLE SODIUM 10 MG/HR: 20 TABLET, DELAYED RELEASE ORAL at 02:00

## 2018-01-01 RX ADMIN — Medication 25 MILLIGRAM(S): at 06:38

## 2018-01-01 RX ADMIN — PANTOPRAZOLE SODIUM 40 MILLIGRAM(S): 20 TABLET, DELAYED RELEASE ORAL at 18:45

## 2018-01-01 RX ADMIN — EPINEPHRINE 117.38 MICROGRAM(S)/KG/MIN: 0.3 INJECTION INTRAMUSCULAR; SUBCUTANEOUS at 12:05

## 2018-01-01 RX ADMIN — Medication 150 MILLIGRAM(S): at 03:58

## 2018-01-01 RX ADMIN — SODIUM CHLORIDE 3 MILLILITER(S): 9 INJECTION INTRAMUSCULAR; INTRAVENOUS; SUBCUTANEOUS at 21:04

## 2018-01-01 RX ADMIN — Medication 2: at 23:19

## 2018-01-01 RX ADMIN — SODIUM CHLORIDE 3 MILLILITER(S): 9 INJECTION INTRAMUSCULAR; INTRAVENOUS; SUBCUTANEOUS at 05:13

## 2018-01-01 RX ADMIN — SODIUM CHLORIDE 3 MILLILITER(S): 9 INJECTION INTRAMUSCULAR; INTRAVENOUS; SUBCUTANEOUS at 05:45

## 2018-01-01 RX ADMIN — POTASSIUM PHOSPHATE, MONOBASIC POTASSIUM PHOSPHATE, DIBASIC 62.5 MILLIMOLE(S): 236; 224 INJECTION, SOLUTION INTRAVENOUS at 16:30

## 2018-01-01 RX ADMIN — Medication 100 GRAM(S): at 13:09

## 2018-01-01 RX ADMIN — Medication 25 MILLIGRAM(S): at 06:15

## 2018-01-01 RX ADMIN — SODIUM CHLORIDE 100 MILLILITER(S): 9 INJECTION INTRAMUSCULAR; INTRAVENOUS; SUBCUTANEOUS at 11:52

## 2018-04-27 NOTE — ED ADULT TRIAGE NOTE - CHIEF COMPLAINT QUOTE
pt awake alert and oriented x3. reports multiple falls since wednesday due to feeling weak in the legs and intermittent dizziness. patient denies anticoags. abrasion to right side of head/

## 2018-04-27 NOTE — ED PROVIDER NOTE - MEDICAL DECISION MAKING DETAILS
Will get head CT, labs, EKG, UA and pt likely needs to be admitted for recurrent falls, possible TIA.

## 2018-04-27 NOTE — ED PROVIDER NOTE - PROGRESS NOTE DETAILS
Patient signed out to Dr. Schreiber pending labs and ct head. She will need admission for management of recurrent falls and weakness; likely secondary to GI bleed. Labs as noted.  Will Transfuse 2units PRBC's and continue Protonix as prescribed and consult GI.  Case d/w Hospitalist and will admit Patient signed out to Dr. Schreiber pending labs and ct head. She will need admission for management of recurrent falls and weakness; likely secondary to GI bleed. Froy Jones contacted and case discussed for consultation in am

## 2018-04-27 NOTE — ED PROVIDER NOTE - OBJECTIVE STATEMENT
This is an 79 year old female patient presenting to the ED c/o recurrent falls onset 2 days ago. Pt endorses positive head trauma with her falls, secondary to weakness. Denies fever, chills, abd pain, n/v/d. Pt has home aide. No further complaints at this time. PMD is Dr. Beth Rincon.

## 2018-04-28 NOTE — H&P ADULT - ASSESSMENT
76 y/o female with symptomatic anemia, Likely UGIB, Dehydration, hyponatremia, hypomagnesemia, malnutrition, Hx of DM-2, OA, HTN

## 2018-04-28 NOTE — H&P ADULT - PROBLEM SELECTOR PLAN 1
Likely due to UGIB based on elevated BUN, melena. Is on Diclofenac which can cause gastritis. Concern for UGI malignancy with age, poor po intake, weight loss. Will need EGD on this admission. Cont. PRBC transfusion, keep on monitor, check echo with 3/6 murmur to r/o AS. PPI bolus/Drip, NPO, IVF, serial CBC, GI evaluation for EGD when medically optimized.  VC boots, no A/C

## 2018-04-28 NOTE — H&P ADULT - NEUROLOGICAL DETAILS
deep reflexes intact/sensation intact/alert and oriented x 3/responds to pain/responds to verbal commands

## 2018-04-28 NOTE — H&P ADULT - HISTORY OF PRESENT ILLNESS
76 y/o female who lives at home and uses walker. She has home health aides and hx of HTN, OA, DM-2. Brought in due to increasing generalized weakness, and fall at home. No c/o focal weakness, CP, SOB, N/V, SOB. Admits to poor appetite. Admits to weight loss but cant quantify amount. Denies any hx of malignancy. Patient is poor historian and intermittently is oriented to person/place. In ED patient found to have Hbg of 6.5 with no prior for comparison and had black stool on exam which was guaiac positive. EKG with subtle ST depression in lateral leads but neg trop and normal BP. Denies any hx of GIB or ever needing a transfusion. Never had EGD/Colonoscopy before. Patient also with evidence of dehydration, hyponatremia, malnutrition. Will be admitted for further w/u and transfusion.

## 2018-04-28 NOTE — PROGRESS NOTE ADULT - SUBJECTIVE AND OBJECTIVE BOX
MELIDA SNOWDEN  ----------------------------------------  The patient was seen and evaluated for anemia.  The patient is in no acute distress.  Denied any chest pain, palpitations, dyspnea, or abdominal pain.  Offers no complaints.    Vital Signs Last 24 Hrs  T(C): 36.7 (28 Apr 2018 09:45), Max: 36.8 (28 Apr 2018 02:35)  T(F): 98.1 (28 Apr 2018 09:45), Max: 98.3 (28 Apr 2018 04:59)  HR: 83 (28 Apr 2018 09:45) (71 - 93)  BP: 121/80 (28 Apr 2018 09:45) (91/56 - 168/69)  BP(mean): 90 (28 Apr 2018 01:55) (90 - 90)  RR: 18 (28 Apr 2018 09:45) (14 - 26)  SpO2: 98% (28 Apr 2018 09:45) (98% - 100%)    CAPILLARY BLOOD GLUCOSE  POCT Blood Glucose.: 97 mg/dL (28 Apr 2018 11:08)  POCT Blood Glucose.: 110 mg/dL (28 Apr 2018 06:49)    PHYSICAL EXAMINATION:  ----------------------------------------  General appearance: No acute distress, Awake, Alert  HEENT: Normocephalic, Atraumatic, Conjunctiva clear, EOMI  Neck: Supple, No JVD, No tenderness  Lungs: Clear to auscultation, Breath sound equal bilaterally, No wheezes, No rales  Cardiovascular: S1S2, Regular rhythm  Abdomen: Soft, Nontender, Nondistended, No guarding/rebound, Positive bowel sounds  Extremities: No clubbing, No cyanosis, No edema, No calf tenderness  Neuro: Strength equal bilaterally, No tremors  Psychiatric: Appropriate mood, Normal affect    LABORATORY STUDIES:  ----------------------------------------             9.6    12.0  )-----------( 432      ( 28 Apr 2018 11:38 )             27.7     04-27    121<L>  |  81<L>  |  40.0<H>  ----------------------------<  126<H>  4.3   |  23.0  |  1.10    Ca    8.3<L>      27 Apr 2018 23:43  Mg     1.5     04-27    TPro  5.5<L>  /  Alb  2.7<L>  /  TBili  0.3<L>  /  DBili  x   /  AST  21  /  ALT  8   /  AlkPhos  50  04-27    LIVER FUNCTIONS - ( 27 Apr 2018 23:43 )  Alb: 2.7 g/dL / Pro: 5.5 g/dL / ALK PHOS: 50 U/L / ALT: 8 U/L / AST: 21 U/L / GGT: x           PT/INR - ( 27 Apr 2018 23:43 )   PT: 12.7 sec;   INR: 1.15 ratio    PTT - ( 27 Apr 2018 23:43 )  PTT:27.8 sec    CARDIAC MARKERS ( 27 Apr 2018 23:43 )  x     / 0.01 ng/mL / x     / x     / x        MEDICATIONS  (STANDING):  dextrose 5%. 1000 milliLiter(s) (50 mL/Hr) IV Continuous <Continuous>  dextrose 50% Injectable 12.5 Gram(s) IV Push once  insulin lispro (HumaLOG) corrective regimen sliding scale   SubCutaneous every 6 hours  metoprolol tartrate 25 milliGRAM(s) Oral two times a day  pantoprazole Infusion 8 mG/Hr (10 mL/Hr) IV Continuous <Continuous>  sodium chloride 0.9% lock flush 3 milliLiter(s) IV Push every 8 hours  sodium chloride 0.9%. 1000 milliLiter(s) (100 mL/Hr) IV Continuous <Continuous>    MEDICATIONS  (PRN):  dextrose Gel 1 Dose(s) Oral once PRN Blood Glucose LESS THAN 70 milliGRAM(s)/deciliter  glucagon  Injectable 1 milliGRAM(s) IntraMuscular once PRN Glucose LESS THAN 70 milligrams/deciliter  ondansetron Injectable 4 milliGRAM(s) IV Push every 6 hours PRN Nausea      ASSESSMENT / PLAN:  ----------------------------------------  Anemia - Status post transfusion of packed red blood cells. Repeat laboratory studies noted improvement in anemia. Further transfusion pending repeat CBC.    Gastrointestinal hemorrhage - Diclofenac discontinued on admission. On pantoprazole. Gastroenterology consultation noted.    Hyponatremia - Hydrochlorothiazide discontinued on admission. On intravenous fluids.    Hypotension - Close blood pressure monitoring. Hypotension improved. Continue to hold antihypertensive medications for now.    Diabetes - Insulin coverage, close monitoring of blood glucose levels. MELIDA SNOWDEN  ----------------------------------------  The patient was seen and evaluated for anemia.  The patient is in no acute distress.  Denied any chest pain, palpitations, dyspnea, or abdominal pain.  Offers no complaints.    Vital Signs Last 24 Hrs  T(C): 36.7 (28 Apr 2018 09:45), Max: 36.8 (28 Apr 2018 02:35)  T(F): 98.1 (28 Apr 2018 09:45), Max: 98.3 (28 Apr 2018 04:59)  HR: 83 (28 Apr 2018 09:45) (71 - 93)  BP: 121/80 (28 Apr 2018 09:45) (91/56 - 168/69)  BP(mean): 90 (28 Apr 2018 01:55) (90 - 90)  RR: 18 (28 Apr 2018 09:45) (14 - 26)  SpO2: 98% (28 Apr 2018 09:45) (98% - 100%)    CAPILLARY BLOOD GLUCOSE  POCT Blood Glucose.: 97 mg/dL (28 Apr 2018 11:08)  POCT Blood Glucose.: 110 mg/dL (28 Apr 2018 06:49)    PHYSICAL EXAMINATION:  ----------------------------------------  General appearance: No acute distress, Awake, Alert  HEENT: Normocephalic, Atraumatic, Conjunctiva clear, EOMI  Neck: Supple, No JVD, No tenderness  Lungs: Clear to auscultation, Breath sound equal bilaterally, No wheezes, No rales  Cardiovascular: S1S2, Regular rhythm  Abdomen: Soft, Nontender, Nondistended, No guarding/rebound, Positive bowel sounds  Extremities: No clubbing, No cyanosis, No edema, No calf tenderness  Neuro: Strength equal bilaterally, No tremors  Psychiatric: Appropriate mood, Normal affect    LABORATORY STUDIES:  ----------------------------------------             9.6    12.0  )-----------( 432      ( 28 Apr 2018 11:38 )             27.7     04-27    121<L>  |  81<L>  |  40.0<H>  ----------------------------<  126<H>  4.3   |  23.0  |  1.10    Ca    8.3<L>      27 Apr 2018 23:43  Mg     1.5     04-27    TPro  5.5<L>  /  Alb  2.7<L>  /  TBili  0.3<L>  /  DBili  x   /  AST  21  /  ALT  8   /  AlkPhos  50  04-27    LIVER FUNCTIONS - ( 27 Apr 2018 23:43 )  Alb: 2.7 g/dL / Pro: 5.5 g/dL / ALK PHOS: 50 U/L / ALT: 8 U/L / AST: 21 U/L / GGT: x           PT/INR - ( 27 Apr 2018 23:43 )   PT: 12.7 sec;   INR: 1.15 ratio    PTT - ( 27 Apr 2018 23:43 )  PTT:27.8 sec    CARDIAC MARKERS ( 27 Apr 2018 23:43 )  x     / 0.01 ng/mL / x     / x     / x        MEDICATIONS  (STANDING):  dextrose 5%. 1000 milliLiter(s) (50 mL/Hr) IV Continuous <Continuous>  dextrose 50% Injectable 12.5 Gram(s) IV Push once  insulin lispro (HumaLOG) corrective regimen sliding scale   SubCutaneous every 6 hours  metoprolol tartrate 25 milliGRAM(s) Oral two times a day  pantoprazole Infusion 8 mG/Hr (10 mL/Hr) IV Continuous <Continuous>  sodium chloride 0.9% lock flush 3 milliLiter(s) IV Push every 8 hours  sodium chloride 0.9%. 1000 milliLiter(s) (100 mL/Hr) IV Continuous <Continuous>    MEDICATIONS  (PRN):  dextrose Gel 1 Dose(s) Oral once PRN Blood Glucose LESS THAN 70 milliGRAM(s)/deciliter  glucagon  Injectable 1 milliGRAM(s) IntraMuscular once PRN Glucose LESS THAN 70 milligrams/deciliter  ondansetron Injectable 4 milliGRAM(s) IV Push every 6 hours PRN Nausea      ASSESSMENT / PLAN:  ----------------------------------------  Anemia - Status post transfusion of packed red blood cells. Repeat laboratory studies noted improvement in anemia. Further transfusion pending repeat CBC.    Gastrointestinal hemorrhage - Diclofenac discontinued on admission. On pantoprazole. Gastroenterology consultation noted. Nasogastric tube insertio attempted but the patient declined.    Hyponatremia - Hydrochlorothiazide discontinued on admission. On intravenous fluids.    Hypertension - Close blood pressure monitoring. On metoprolol. Echocardiogram requested but the patient declined stating that it was not necessary.    Diabetes - Insulin coverage, close monitoring of blood glucose levels. MELIDA SNOWDEN  ----------------------------------------  The patient was seen and evaluated for anemia.  The patient is in no acute distress.  Denied any chest pain, palpitations, dyspnea, or abdominal pain.  Offers no complaints.    Vital Signs Last 24 Hrs  T(C): 36.7 (28 Apr 2018 09:45), Max: 36.8 (28 Apr 2018 02:35)  T(F): 98.1 (28 Apr 2018 09:45), Max: 98.3 (28 Apr 2018 04:59)  HR: 83 (28 Apr 2018 09:45) (71 - 93)  BP: 121/80 (28 Apr 2018 09:45) (91/56 - 168/69)  BP(mean): 90 (28 Apr 2018 01:55) (90 - 90)  RR: 18 (28 Apr 2018 09:45) (14 - 26)  SpO2: 98% (28 Apr 2018 09:45) (98% - 100%)    CAPILLARY BLOOD GLUCOSE  POCT Blood Glucose.: 97 mg/dL (28 Apr 2018 11:08)  POCT Blood Glucose.: 110 mg/dL (28 Apr 2018 06:49)    PHYSICAL EXAMINATION:  ----------------------------------------  General appearance: No acute distress, Awake, Alert  HEENT: Normocephalic, Atraumatic, Conjunctiva clear, EOMI  Neck: Supple, No JVD, No tenderness  Lungs: Clear to auscultation, Breath sound equal bilaterally, No wheezes, No rales  Cardiovascular: S1S2, Regular rhythm  Abdomen: Soft, Nontender, Nondistended, No guarding/rebound, Positive bowel sounds  Extremities: No clubbing, No cyanosis, No edema, No calf tenderness  Neuro: Strength equal bilaterally, No tremors  Psychiatric: Appropriate mood, Normal affect    LABORATORY STUDIES:  ----------------------------------------             9.6    12.0  )-----------( 432      ( 28 Apr 2018 11:38 )             27.7     04-27    121<L>  |  81<L>  |  40.0<H>  ----------------------------<  126<H>  4.3   |  23.0  |  1.10    Ca    8.3<L>      27 Apr 2018 23:43  Mg     1.5     04-27    TPro  5.5<L>  /  Alb  2.7<L>  /  TBili  0.3<L>  /  DBili  x   /  AST  21  /  ALT  8   /  AlkPhos  50  04-27    LIVER FUNCTIONS - ( 27 Apr 2018 23:43 )  Alb: 2.7 g/dL / Pro: 5.5 g/dL / ALK PHOS: 50 U/L / ALT: 8 U/L / AST: 21 U/L / GGT: x           PT/INR - ( 27 Apr 2018 23:43 )   PT: 12.7 sec;   INR: 1.15 ratio    PTT - ( 27 Apr 2018 23:43 )  PTT:27.8 sec    CARDIAC MARKERS ( 27 Apr 2018 23:43 )  x     / 0.01 ng/mL / x     / x     / x        MEDICATIONS  (STANDING):  dextrose 5%. 1000 milliLiter(s) (50 mL/Hr) IV Continuous <Continuous>  dextrose 50% Injectable 12.5 Gram(s) IV Push once  insulin lispro (HumaLOG) corrective regimen sliding scale   SubCutaneous every 6 hours  metoprolol tartrate 25 milliGRAM(s) Oral two times a day  pantoprazole Infusion 8 mG/Hr (10 mL/Hr) IV Continuous <Continuous>  sodium chloride 0.9% lock flush 3 milliLiter(s) IV Push every 8 hours  sodium chloride 0.9%. 1000 milliLiter(s) (100 mL/Hr) IV Continuous <Continuous>    MEDICATIONS  (PRN):  dextrose Gel 1 Dose(s) Oral once PRN Blood Glucose LESS THAN 70 milliGRAM(s)/deciliter  glucagon  Injectable 1 milliGRAM(s) IntraMuscular once PRN Glucose LESS THAN 70 milligrams/deciliter  ondansetron Injectable 4 milliGRAM(s) IV Push every 6 hours PRN Nausea      ASSESSMENT / PLAN:  ----------------------------------------  Anemia - Status post transfusion of packed red blood cells. Repeat laboratory studies noted improvement in anemia. Further transfusion pending repeat CBC.    Gastrointestinal hemorrhage - Diclofenac discontinued on admission. On pantoprazole. Gastroenterology consultation noted. Nasogastric tube insertio attempted but the patient declined.    Hyponatremia - Hydrochlorothiazide discontinued on admission. On intravenous fluids.    Hypertension - Close blood pressure monitoring. On metoprolol. Echocardiogram requested but the patient declined stating that it was not necessary.    Diabetes - Insulin coverage, close monitoring of blood glucose levels.    The patient reports that she normally requires the use of a walker when ambulating but feels that her legs are weaker now. Physical Therapy evaluation pending.

## 2018-04-28 NOTE — H&P ADULT - PROBLEM SELECTOR PLAN 4
hypovolemic hyponatremia compounded by HCTZ. Hold diuretics. Cont. NS, serial chemistry to trend Na,

## 2018-04-28 NOTE — CONSULT NOTE ADULT - SUBJECTIVE AND OBJECTIVE BOX
HPI:  78 y/o female who lives at home and uses walker. She has home health aides and hx of HTN, OA, DM-2. On HCTZ, metformin and recently started on Diclofenac 1 month ago for OA of knee.  Medication works well;  No knee pain.  Brought in due to increasing generalized weakness, and fall at home. No c/o focal weakness, CP, SOB, N/V, SOB. Admits to poor appetite. Admits to weight loss but cant quantify amount. Denies any hx of malignancy. Patient is poor historian and intermittently is oriented to person/place. In ED patient found to have Hbg of 6.5 with no prior for comparison and had black stool on exam which was guaiac positive. EKG with subtle ST depression in lateral leads but neg trop and normal BP. Denies any hx of GIB or ever needing a transfusion. Never had EGD/Colonoscopy before. Patient also with evidence of dehydration, hyponatremia, malnutrition. Denies any complaints of abdominal pain but had heartburn 1 week ago and vomited 3 nights ago.  Cannot characterize the vomitus.  Heartburn has resolved.  No hx of PUD.  No hx of liver disease.    Transfused 2 units PRBC's for anemia.  Just had a large melanotic BM in the er.        PAST MEDICAL & SURGICAL HISTORY:  Other type of osteoarthritis  Type 2 diabetes mellitus with complication, without long-term current use of insulin  Essential hypertension  No significant past surgical history      ROS:  No Heartburn, regurgitation, dysphagia, odynophagia.  No dyspepsia  No abdominal pain.    No Nausea, vomiting.  No Bleeding.  No hematemesis.   No diarrhea.    No hematochezia.  No weight loss, anorexia.  No edema.      MEDICATIONS  (STANDING):  dextrose 5%. 1000 milliLiter(s) (50 mL/Hr) IV Continuous <Continuous>  dextrose 50% Injectable 12.5 Gram(s) IV Push once  insulin lispro (HumaLOG) corrective regimen sliding scale   SubCutaneous every 6 hours  magnesium sulfate  IVPB 2 Gram(s) IV Intermittent every 1 hour  metoprolol tartrate 25 milliGRAM(s) Oral two times a day  pantoprazole Infusion 8 mG/Hr (10 mL/Hr) IV Continuous <Continuous>  sodium chloride 0.9% lock flush 3 milliLiter(s) IV Push every 8 hours  sodium chloride 0.9%. 1000 milliLiter(s) (100 mL/Hr) IV Continuous <Continuous>    MEDICATIONS  (PRN):  dextrose Gel 1 Dose(s) Oral once PRN Blood Glucose LESS THAN 70 milliGRAM(s)/deciliter  glucagon  Injectable 1 milliGRAM(s) IntraMuscular once PRN Glucose LESS THAN 70 milligrams/deciliter  ondansetron Injectable 4 milliGRAM(s) IV Push every 6 hours PRN Nausea      Allergies  No Known Allergies    SOCIAL HISTORY:  smoker.  No etoh or ivdu.      FAMILY HISTORY:  No pertinent family history in first degree relatives      Vital Signs Last 24 Hrs  T(C): 36.7 (28 Apr 2018 09:45), Max: 36.8 (28 Apr 2018 02:35)  T(F): 98.1 (28 Apr 2018 09:45), Max: 98.3 (28 Apr 2018 04:59)  HR: 83 (28 Apr 2018 09:45) (71 - 93)  BP: 121/80 (28 Apr 2018 09:45) (91/56 - 168/69)  BP(mean): 90 (28 Apr 2018 01:55) (90 - 90)  RR: 18 (28 Apr 2018 09:45) (14 - 26)  SpO2: 98% (28 Apr 2018 09:45) (98% - 100%)    PHYSICAL EXAM:    GENERAL: NAD, well-groomed, well-developed; Discheveled.  Malnourished.    HEAD:  Atraumatic, Normocephalic  EYES: EOMI, PERRLA, conjunctiva and sclera clear  ENMT: No tonsillar erythema, exudates, or enlargement; Moist mucous membranes, Good dentition, No lesions  NECK: Supple, No JVD, Normal thyroid  CHEST/LUNG: Clear to percussion bilaterally; No rales, rhonchi, wheezing, or rubs  HEART: Regular rate and rhythm; No rubs, or gallops;  33/6 LUCAS LSB.    ABDOMEN: Soft, Nontender, Nondistended; Bowel sounds present.  No scars.  Obese.  No HSM.  No MTR.  LAM:  No lesions.  Melena.  No hemorrhoids.  Smoe dark red blood.   EXTREMITIES:  2+ Peripheral Pulses, No clubbing, cyanosis, or edema  LYMPH: No lymphadenopathy noted  SKIN: No rashes or lesions      LABS:                        6.5    12.8  )-----------( 540      ( 27 Apr 2018 23:43 )             19.5     04-27    121<L>  |  81<L>  |  40.0<H>  ----------------------------<  126<H>  4.3   |  23.0  |  1.10    Ca    8.3<L>      27 Apr 2018 23:43  Mg     1.5     04-27    TPro  5.5<L>  /  Alb  2.7<L>  /  TBili  0.3<L>  /  DBili  x   /  AST  21  /  ALT  8   /  AlkPhos  50  04-27    PT/INR - ( 27 Apr 2018 23:43 )   PT: 12.7 sec;   INR: 1.15 ratio         PTT - ( 27 Apr 2018 23:43 )  PTT:27.8 sec       LIVER FUNCTIONS - ( 27 Apr 2018 23:43 )  Alb: 2.7 g/dL / Pro: 5.5 g/dL / ALK PHOS: 50 U/L / ALT: 8 U/L / AST: 21 U/L / GGT: x           EKG:  NSR;  LVH c strain.      RADIOLOGY & ADDITIONAL STUDIES:  CXR:  NAPD

## 2018-04-28 NOTE — ED ADULT NURSE NOTE - OBJECTIVE STATEMENT
Pt a&ox1 to person only states she is in the hospital because she is really tired. Pt asking if she can just sleep and is uncooperative during RN assessment. Pt has had multiple falls in the last week as per pt's HHA. Pt presents with an abrasion to her R temple and there are no other obvious deformities noted. Pt denies pain, fever/chills,

## 2018-04-28 NOTE — H&P ADULT - PMH
Essential hypertension    Other type of osteoarthritis    Type 2 diabetes mellitus with complication, without long-term current use of insulin

## 2018-04-29 NOTE — PROGRESS NOTE ADULT - SUBJECTIVE AND OBJECTIVE BOX
Patient seen and examined.  Events noted.  Pt refused NGT.  No further BM since melena mid day yesterday.  Transfused 2 units prbc's and Hgb  stable at 9 range.  Normal Vitals.      PAST MEDICAL & SURGICAL HISTORY:  Other type of osteoarthritis  Type 2 diabetes mellitus with complication, without long-term current use of insulin  Essential hypertension  No significant past surgical history      ROS:  No Heartburn, regurgitation, dysphagia, odynophagia.  No dyspepsia  No abdominal pain.    No Nausea, vomiting.  No Bleeding.  No hematemesis.   No diarrhea.    No hematochesia.  No weight loss, anorexia.  No edema.      MEDICATIONS  (STANDING):  dextrose 5%. 1000 milliLiter(s) (50 mL/Hr) IV Continuous <Continuous>  dextrose 50% Injectable 12.5 Gram(s) IV Push once  insulin lispro (HumaLOG) corrective regimen sliding scale   SubCutaneous every 6 hours  metoprolol tartrate 25 milliGRAM(s) Oral two times a day  pantoprazole Infusion 8 mG/Hr (10 mL/Hr) IV Continuous <Continuous>  potassium chloride    Tablet ER 20 milliEquivalent(s) Oral every 2 hours  sodium chloride 0.9% lock flush 3 milliLiter(s) IV Push every 8 hours    MEDICATIONS  (PRN):  dextrose Gel 1 Dose(s) Oral once PRN Blood Glucose LESS THAN 70 milliGRAM(s)/deciliter  glucagon  Injectable 1 milliGRAM(s) IntraMuscular once PRN Glucose LESS THAN 70 milligrams/deciliter  ondansetron Injectable 4 milliGRAM(s) IV Push every 6 hours PRN Nausea      Allergies  No Known Allergies    Vital Signs Last 24 Hrs  T(C): 36.9 (29 Apr 2018 07:32), Max: 36.9 (29 Apr 2018 07:32)  T(F): 98.4 (29 Apr 2018 07:32), Max: 98.4 (29 Apr 2018 07:32)  HR: 67 (29 Apr 2018 07:32) (67 - 82)  BP: 123/98 (29 Apr 2018 07:32) (122/57 - 147/63)  BP(mean): --  RR: 23 (29 Apr 2018 07:32) (18 - 23)  SpO2: 98% (29 Apr 2018 07:32) (96% - 98%)    PHYSICAL EXAM:    GENERAL: NAD, well-groomed, well-developed  HEAD:  Atraumatic, Normocephalic  EYES: EOMI, PERRLA, conjunctiva and sclera clear  ENMT: No tonsillar erythema, exudates, or enlargement; Moist mucous membranes, Good dentition, No lesions  NECK: Supple, No JVD, Normal thyroid  CHEST/LUNG: Clear to percussion bilaterally; No rales, rhonchi, wheezing, or rubs  HEART: Regular rate and rhythm; No murmurs, rubs, or gallops  ABDOMEN: Soft, Nontender, Nondistended; Bowel sounds present.  No HSM.  No MTR.    EXTREMITIES:  2+ Peripheral Pulses, No clubbing, cyanosis, or edema  LYMPH: No lymphadenopathy noted  SKIN: No rashes or lesions      LABS:                        9.0    9.5   )-----------( 419      ( 29 Apr 2018 09:04 )             26.3     04-29    133<L>  |  94<L>  |  22.0<H>  ----------------------------<  77  3.3<L>   |  23.0  |  1.02    Ca    8.1<L>      29 Apr 2018 09:04  Phos  2.6     04-29  Mg     2.1     04-29    TPro  5.5<L>  /  Alb  2.7<L>  /  TBili  0.3<L>  /  DBili  x   /  AST  21  /  ALT  8   /  AlkPhos  50  04-27    PT/INR - ( 27 Apr 2018 23:43 )   PT: 12.7 sec;   INR: 1.15 ratio         PTT - ( 27 Apr 2018 23:43 )  PTT:27.8 sec         RADIOLOGY & ADDITIONAL STUDIES:

## 2018-04-29 NOTE — PROGRESS NOTE ADULT - SUBJECTIVE AND OBJECTIVE BOX
MELIDA SNOWDEN  ----------------------------------------  The patient was seen and evaluated for anemia.  The patient is in no acute distress.  Denied any chest pain, palpitations, dyspnea, or abdominal pain.  Reports feeling weak.    Vital Signs Last 24 Hrs  T(C): 36.9 (29 Apr 2018 07:32), Max: 36.9 (29 Apr 2018 07:32)  T(F): 98.4 (29 Apr 2018 07:32), Max: 98.4 (29 Apr 2018 07:32)  HR: 67 (29 Apr 2018 07:32) (67 - 82)  BP: 123/98 (29 Apr 2018 07:32) (122/57 - 147/63)  BP(mean): --  RR: 23 (29 Apr 2018 07:32) (18 - 23)  SpO2: 98% (29 Apr 2018 07:32) (96% - 98%)    CAPILLARY BLOOD GLUCOSE  POCT Blood Glucose.: 79 mg/dL (29 Apr 2018 12:34)  POCT Blood Glucose.: 78 mg/dL (29 Apr 2018 06:13)  POCT Blood Glucose.: 81 mg/dL (29 Apr 2018 01:10)  POCT Blood Glucose.: 91 mg/dL (28 Apr 2018 18:30)    PHYSICAL EXAMINATION:  ----------------------------------------  General appearance: No acute distress, Awake, Alert  HEENT: Normocephalic, Atraumatic, Conjunctiva clear, EOMI  Neck: Supple, No JVD, No tenderness  Lungs: Clear to auscultation, Breath sound equal bilaterally, No wheezes, No rales  Cardiovascular: S1S2, Regular rhythm  Abdomen: Soft, Nontender, Nondistended, No guarding/rebound, Positive bowel sounds  Extremities: No clubbing, No cyanosis, No edema, No calf tenderness  Neuro: Strength equal bilaterally, No tremors  Psychiatric: Appropriate mood, Normal affect    LABORATORY STUDIES:  ----------------------------------------             9.0    9.5   )-----------( 419      ( 29 Apr 2018 09:04 )             26.3     04-29    133<L>  |  94<L>  |  22.0<H>  ----------------------------<  77  3.3<L>   |  23.0  |  1.02    Ca    8.1<L>      29 Apr 2018 09:04  Phos  2.6     04-29  Mg     2.1     04-29    TPro  5.5<L>  /  Alb  2.7<L>  /  TBili  0.3<L>  /  DBili  x   /  AST  21  /  ALT  8   /  AlkPhos  50  04-27    LIVER FUNCTIONS - ( 27 Apr 2018 23:43 )  Alb: 2.7 g/dL / Pro: 5.5 g/dL / ALK PHOS: 50 U/L / ALT: 8 U/L / AST: 21 U/L / GGT: x           PT/INR - ( 27 Apr 2018 23:43 )   PT: 12.7 sec;   INR: 1.15 ratio    PTT - ( 27 Apr 2018 23:43 )  PTT:27.8 sec    CARDIAC MARKERS ( 27 Apr 2018 23:43 )  x     / 0.01 ng/mL / x     / x     / x        MEDICATIONS  (STANDING):  dextrose 5%. 1000 milliLiter(s) (50 mL/Hr) IV Continuous <Continuous>  dextrose 50% Injectable 12.5 Gram(s) IV Push once  insulin lispro (HumaLOG) corrective regimen sliding scale   SubCutaneous every 6 hours  metoprolol tartrate 25 milliGRAM(s) Oral two times a day  pantoprazole Infusion 8 mG/Hr (10 mL/Hr) IV Continuous <Continuous>  potassium chloride    Tablet ER 20 milliEquivalent(s) Oral every 2 hours  sodium chloride 0.9% lock flush 3 milliLiter(s) IV Push every 8 hours  sodium chloride 0.9%. 1000 milliLiter(s) (100 mL/Hr) IV Continuous <Continuous>    MEDICATIONS  (PRN):  dextrose Gel 1 Dose(s) Oral once PRN Blood Glucose LESS THAN 70 milliGRAM(s)/deciliter  glucagon  Injectable 1 milliGRAM(s) IntraMuscular once PRN Glucose LESS THAN 70 milligrams/deciliter  ondansetron Injectable 4 milliGRAM(s) IV Push every 6 hours PRN Nausea      ASSESSMENT / PLAN:  ----------------------------------------  Anemia - Status post transfusion of packed red blood cells with improvement in blood count.    Gastrointestinal hemorrhage - Discussed with Gastroenterology, planned for upper endoscopy for evaluation. Diclofenac discontinued on admission. On pantoprazole.    Hyponatremia - Hydrochlorothiazide discontinued on admission. Potassium supplementation for hypokalemia.    Hypertension - Close blood pressure monitoring. On metoprolol. Murmur noted on examination which the patient noting that it has been present for many years. She declined echocardiogram to evaluate.    Diabetes - Insulin coverage, close monitoring of blood glucose levels.

## 2018-04-29 NOTE — PROGRESS NOTE ADULT - ASSESSMENT
UGI bleeding.  Recent use of NSAID x 1 month.  Hemodynamically improved.  Stable.    Plan for EGD tomorrow.  OK for Clear liquids today.  NPO p MN tonight for EGD tomorrow.  ASA #3.  Same protonix drip. Modest IV fluids.   PRB and pre were reviewed with pt who understands and agrees to proceed.

## 2018-04-29 NOTE — PATIENT PROFILE ADULT. - NS TRANSFER PATIENT BELONGINGS
None/medication given to friend at bedside/Other belongings Other belongings/medication given to friend at bedside-Mikael Norton/None

## 2018-04-30 PROBLEM — Z00.00 ENCOUNTER FOR PREVENTIVE HEALTH EXAMINATION: Status: ACTIVE | Noted: 2018-01-01

## 2018-04-30 NOTE — PROGRESS NOTE ADULT - SUBJECTIVE AND OBJECTIVE BOX
CC: weakness, fall     HPI:  76 y/o female who lives at home and uses walker. She has home health aides and hx of HTN, OA, DM-2. Brought in due to increasing generalized weakness, and fall at home.  Admits to poor appetite. Admits to weight loss but can't quantify amount.  Patient is poor historian and intermittently is oriented to person/place. In ED patient found to have Hbg of 6.5 with no prior for comparison and had black stool on exam which was guaiac positive. EKG with subtle ST depression in lateral leads but neg trop and normal BP.     INTERVAL HPI/OVERNIGHT EVENTS:  Patient seen and examined lying in bed.  Patient was hypoxic with SOB last night and put on O2 supplementation.  Patient denies any headache, dizziness, CP, abdominal pain, nausea, vomiting, dysuria.  ROS limited secondary to forgetfulness.    Vital Signs Last 24 Hrs  T(C): 36.9 (30 Apr 2018 08:05), Max: 37.1 (30 Apr 2018 01:40)  T(F): 98.4 (30 Apr 2018 08:05), Max: 98.7 (30 Apr 2018 01:40)  HR: 70 (30 Apr 2018 09:06) (68 - 95)  BP: 96/48 (30 Apr 2018 08:05) (96/48 - 134/63)  BP(mean): --  RR: 22 (30 Apr 2018 08:05) (18 - 22)  SpO2: 96% (30 Apr 2018 09:06) (94% - 100%)  I&O's Detail    30 Apr 2018 07:01  -  30 Apr 2018 15:45  --------------------------------------------------------  IN:    Oral Fluid: 360 mL  Total IN: 360 mL    OUT:  Total OUT: 0 mL    Total NET: 360 mL      PHYSICAL EXAM:  GENERAL: NAD  HEAD:  Atraumatic, Normocephalic  NECK: Supple, No JVD, Normal thyroid  NERVOUS SYSTEM:  Alert, forgetful at times, Good concentration; generalized weakness  CHEST/LUNG: Diminished BS bilaterally  HEART: Regular rate and rhythm; No murmurs, rubs, or gallops  ABDOMEN: Soft, Nontender, Nondistended; Bowel sounds present  EXTREMITIES:  2+ Peripheral Pulses, No clubbing, cyanosis, or edema                                9.3    12.6  )-----------( 448      ( 30 Apr 2018 02:58 )             28.6     30 Apr 2018 02:58    129    |  94     |  17.0   ----------------------------<  234    3.6     |  21.0   |  1.09     Ca    8.3        30 Apr 2018 02:58  Phos  2.6       29 Apr 2018 09:04  Mg     2.1       29 Apr 2018 09:04    TPro  5.9    /  Alb  2.9    /  TBili  0.4    /  DBili  x      /  AST  74     /  ALT  17     /  AlkPhos  60     30 Apr 2018 02:58      CAPILLARY BLOOD GLUCOSE  POCT Blood Glucose.: 150 mg/dL (30 Apr 2018 12:16)  POCT Blood Glucose.: 167 mg/dL (30 Apr 2018 06:33)  POCT Blood Glucose.: 204 mg/dL (29 Apr 2018 23:06)  POCT Blood Glucose.: 218 mg/dL (29 Apr 2018 18:10)    LIVER FUNCTIONS - ( 30 Apr 2018 02:58 )  Alb: 2.9 g/dL / Pro: 5.9 g/dL / ALK PHOS: 60 U/L / ALT: 17 U/L / AST: 74 U/L / GGT: x           Procalcitonin, Serum (04.30.18 @ 11:53)    Procalcitonin, Serum: 0.46: Procalcitonin (PCT) Interpretation (ng/mL) - Diagnosis of systemic  bacterial infection/sepsis  PCT < 0.5: Systemic infection (sepsis) is not likely and risk for  progression to severe systemic infection is low. Local bacterial  infection is possible. If early sepsis is suspected clinically, PCT  should be re-assessed in 6-24 hours.  PCT >/= 0.5 but < 2.0: Systemic infection (sepsis) is possible, but other  conditions are known to elevate PCT as well. Moderate risk for  progression to severe systemic infection. The patient should be closely  monitored both clinically and by re-assessing PCT within 6-24 hours.  PCT >/= 2.0 but < 10.0: Systemic infection (sepsis) is likely, unless  other causes are known. High risk of progression to severe systemic  infection (severe sepsis/septic shock).  PCT >/= 10.0: Important systemic inflammatory response, almost  exclusively due to severe bacterial sepsis or septic shock. High  likelihood of severe sepsis or septic shock. ng/mL        Hemoglobin A1C, Whole Blood: 5.0 % (04-29-18 @ 09:04)    MEDICATIONS  (STANDING):  ALBUTerol    0.083% 2.5 milliGRAM(s) Nebulizer every 6 hours  dextrose 5%. 1000 milliLiter(s) (50 mL/Hr) IV Continuous <Continuous>  dextrose 50% Injectable 12.5 Gram(s) IV Push once  furosemide   Injectable 20 milliGRAM(s) IV Push once  insulin lispro (HumaLOG) corrective regimen sliding scale   SubCutaneous every 6 hours  metoprolol tartrate 25 milliGRAM(s) Oral two times a day  pantoprazole Infusion 8 mG/Hr (10 mL/Hr) IV Continuous <Continuous>  piperacillin/tazobactam IVPB. 3.375 Gram(s) IV Intermittent every 8 hours  potassium chloride    Tablet ER 20 milliEquivalent(s) Oral once  sodium chloride 0.9% lock flush 3 milliLiter(s) IV Push every 8 hours  vancomycin  IVPB 750 milliGRAM(s) IV Intermittent every 12 hours  vancomycin  IVPB        MEDICATIONS  (PRN):  dextrose Gel 1 Dose(s) Oral once PRN Blood Glucose LESS THAN 70 milliGRAM(s)/deciliter  glucagon  Injectable 1 milliGRAM(s) IntraMuscular once PRN Glucose LESS THAN 70 milligrams/deciliter  ondansetron Injectable 4 milliGRAM(s) IV Push every 6 hours PRN Nausea      RADIOLOGY & ADDITIONAL TESTS:  < from: Xray Chest 1 View- PORTABLE-Urgent (04.30.18 @ 05:55) >   EXAM:  XR CHEST PORTABLE URGENT 1V                          PROCEDURE DATE:  04/30/2018          INTERPRETATION:  TECHNIQUE: Single portable view of the chest.    COMPARISON: 4/27/2018    CLINICAL HISTORY: Shortness of breath, desaturation.     FINDINGS:    Single frontal view of the chest demonstrates new bilateral lower lobe   infiltrates/effusions. Numerous tiny bilateral pulmonary granulomas. The   cardiomediastinal silhouette is normal. No acute osseous abnormalities.   Overlying EKG leads and wires are noted.    IMPRESSION: New bilateral lower lobe infiltrates/effusions. Differential   diagnosis includes CHF.                ARCELIA SHERIFF M.D., ATTENDING RADIOLOGIST  This document has been electronically signed. Apr 30 2018  9:38AM                < end of copied text >

## 2018-04-30 NOTE — PROGRESS NOTE ADULT - SUBJECTIVE AND OBJECTIVE BOX
Patient is a 77y old  Female who presents with a chief complaint of weakness, fall (28 Apr 2018 01:55)      PAST MEDICAL HISTORY:  Other type of osteoarthritis  Type 2 diabetes mellitus with complication, without long-term current use of insulin  Essential hypertension      PAST SURGICAL HISTORY:  No significant past surgical history      MEDICATIONS  (STANDING):  ALBUTerol    0.083% 2.5 milliGRAM(s) Nebulizer every 6 hours  dextrose 5%. 1000 milliLiter(s) (50 mL/Hr) IV Continuous <Continuous>  dextrose 50% Injectable 12.5 Gram(s) IV Push once  insulin lispro (HumaLOG) corrective regimen sliding scale   SubCutaneous every 6 hours  metoprolol tartrate 25 milliGRAM(s) Oral two times a day  pantoprazole Infusion 8 mG/Hr (10 mL/Hr) IV Continuous <Continuous>  piperacillin/tazobactam IVPB. 3.375 Gram(s) IV Intermittent every 8 hours  sodium chloride 0.9% lock flush 3 milliLiter(s) IV Push every 8 hours  vancomycin  IVPB 750 milliGRAM(s) IV Intermittent every 12 hours  vancomycin  IVPB        MEDICATIONS  (PRN):  dextrose Gel 1 Dose(s) Oral once PRN Blood Glucose LESS THAN 70 milliGRAM(s)/deciliter  glucagon  Injectable 1 milliGRAM(s) IntraMuscular once PRN Glucose LESS THAN 70 milligrams/deciliter  ondansetron Injectable 4 milliGRAM(s) IV Push every 6 hours PRN Nausea      Allergies    No Known Allergies    Intolerances        SOCIAL HISTORY:                          9.3    12.6  )-----------( 448      ( 30 Apr 2018 02:58 )             28.6       PT/INR - ( 27 Apr 2018 23:43 )   PT: 12.7 sec;   INR: 1.15 ratio         PTT - ( 27 Apr 2018 23:43 )  PTT:27.8 sec    04-30    129<L>  |  94<L>  |  17.0  ----------------------------<  234<H>  3.6   |  21.0<L>  |  1.09    Ca    8.3<L>      30 Apr 2018 02:58  Phos  2.6     04-29  Mg     2.1     04-29    TPro  5.9<L>  /  Alb  2.9<L>  /  TBili  0.4  /  DBili  x   /  AST  74<H>  /  ALT  17  /  AlkPhos  60  04-30          EKG:    TT ECHO:    RADIOLOGY:    ASA # =             Mallampati # =

## 2018-04-30 NOTE — PROGRESS NOTE ADULT - ASSESSMENT
Patient with melena on PPI infusion and has respiratory distress overnight    1. Continue PPI infusion  2. Monitor CBC q 8 hourly  3. EGD on hold at this time  4. Pulmonary and cardiology clearance before EGD

## 2018-04-30 NOTE — DIETITIAN INITIAL EVALUATION ADULT. - PHYSICAL APPEARANCE
physical signs of malnutrition [ ]absent [ X]present. [ ]Mild [X ]Moderate [ ]Severe Muscle wasting present at [X ]temples [ ]clavicle [ ]interosseos [X ]calf & Shoulders[ ]Mild [ ]Moderate [ ]Severe subcutaneous fat loss presents at [ ]ribs []orbital region [ ]triceps [ ]buccal area./other (specify)

## 2018-04-30 NOTE — CHART NOTE - NSCHARTNOTEFT_GEN_A_CORE
76 y/o female, was admitted on 4/28 for anemia. Today started c/o SOB and de-sating at 2230. Pulse ox was 85-86 so was put on nasal cannula, then 50%venti mask, now on oxy mask.   Was still de-sating to low 90's. Albuterol neb treatment given and pulse ox improved to 95-99%. Pt. states to be feeling a little better. On a monitor.  Patient takes HCTZ at home 25mg daily which has been stopped due to low Na. which has been improving.  General: Patient is awake, alert and answering questions. Appears in mild-moderate distress  Lungs: Bilateral wheeze all lung fields, Rhonchi heard worse left side.  A/P:   Difficulty breathing with  SOB and decrease O2   -Stat chest x-ray   -Stat CBC and Chem profile 76 y/o female, was admitted on 4/28 for anemia. Today started c/o SOB and de-sating at 2230. Pulse ox was 85-86 so was put on nasal cannula, then 50%venti mask, now on oxy mask.   Was still de-sating to low 90's. Albuterol neb treatment given and pulse ox improved to 95-99%. Pt. states to be feeling a little better. On a monitor.  Patient takes HCTZ at home 25mg daily which has been stopped due to low Na. which has been improving.  General: Patient is awake, alert and answering questions. Appears in mild distress  Lungs: Positive Bilateral wheeze and Rhonchi heard worse left lower field  A/P:   Difficulty breathing with SOB and decrease O2   -Stat chest x-ray   -Stat CBC and Chem profile  Chest x-ray showed infiltrates in lower lung g[place]   Spoke with  and ordered Zosyn, Vancomycin, Neb. treatments Q6, and RVP   Will continue to monitor closely

## 2018-04-30 NOTE — PROGRESS NOTE ADULT - ASSESSMENT
78 y/o female who lives at home and uses walker. She has home health aides and hx of HTN, OA, DM-2. Brought in due to increasing generalized weakness, and fall at home.  Admits to poor appetite. Admits to weight loss but can't quantify amount.  Patient is poor historian and intermittently is oriented to person/place. In ED patient found to have Hbg of 6.5 with no prior for comparison and had black stool on exam which was guaiac positive. EKG with subtle ST depression in lateral leads but neg trop and normal BP.     Acute hypoxic respiratory failure - Continue O2 supplementation.  CXR with bilateral effusions/infiltrates.  Lasix 20mg IV x1.  TTE pending.  Started on Vancomycin/Zosyn for presumed pneumonia.  Blood cultures ordered.    Anemia - Status post transfusion of packed red blood cells with improvement in blood count.    Gastrointestinal hemorrhage - Discussed with Gastroenterology, planned for upper endoscopy today, but cancelled secondary hypoxia last night. Diclofenac discontinued on admission. On pantoprazole.    Hyponatremia - Hydrochlorothiazide discontinued on admission. Potassium supplementation for hypokalemia.    Hypertension - Close blood pressure monitoring. On metoprolol. Murmur noted on examination which the patient noting that it has been present for many years. TTE pending.    Diabetes - Insulin coverage, close monitoring of blood glucose levels.

## 2018-04-30 NOTE — DIETITIAN INITIAL EVALUATION ADULT. - PERTINENT LABORATORY DATA
04-30 Na129 mmol/L<L> Glu 234 mg/dL<H> K+ 3.6 mmol/L Cr  1.09 mg/dL BUN 17.0 mg/dL Phos n/a   Alb 2.9 g/dL<L> PAB n/a

## 2018-04-30 NOTE — DIETITIAN INITIAL EVALUATION ADULT. - PROBLEM SELECTOR PROBLEM 5
My Migraine Action Plan      Date: 11/13/2017     My Name: Nasra Ortiz   YOB: 1982  My Pharmacy:    "[facility]" #[id number] - [place], [place] - [facility], [address] [id number] - [place], [place] - [address] [facility] [place]        My (Preventative) Control Medicine: None        My Rescue Medicine: Maxalt, Phenergan   My Doctor: Adrienne Dailey     My Clinic: [facility]  [address]  [facility] [id number]  [phone]        GREEN ZONE = Good Control    My headache plan is working.   I can do what I need to do.           I WILL:     ? Keep managing my triggers.  ? Write in my migraine diary each time I have a headache.  ? Keep taking my preventive (controller) medicine daily.  ? Take my relief and rescue medicine as needed.             YELLOW ZONE = Not Enough Control    My headache plan isn t always working.   My headaches keep me from doing   some of the things I need to do.       I WILL:     ? Set goals to control my triggers and act on them.  ? Write in my migraine diary each time I have a headache and review it for                      patterns or new triggers.  ? Keep taking my preventive (controller) medicine daily.  ? Take my relief and rescue medicine as needed.  ? Call my doctor or clinic at if I stay in the Yellow Zone.             RED ZONE = Poor or No Control    My headache plan has  failed. I can t do anything  when I have one. My  medicines aren t working.           I WILL:   ? Set goals to control my triggers and act on them.  ? Write in my migraine diary each time I have a headache and review it for                      patterns or new triggers.  ? Keep taking my preventive (controller) medicine daily.  ? Take my relief and rescue medicine as needed.  ? Call my doctor or clinic or go to urgent care or an ER if I m having the worst                  headache of my life.  ? Call my doctor or clinic or go to urgent care or an ER if my medicine doesn t  work.  ? Let my doctor or clinic know within 2 weeks if I have gone to an urgent care or             emergency department.          Provider specific instructions:  No      Essential hypertension

## 2018-05-01 NOTE — PROGRESS NOTE ADULT - SUBJECTIVE AND OBJECTIVE BOX
Patient is a 77y old  Female who presents with a chief complaint of falling on the floor.  She says   that she was able to call for an ambulance.  (2018 01:55) She is scheduled to have an  EGD.  She appears to be somewhat confused.      PAST MEDICAL HISTORY:  L.V.E.F. = 30 to 35% with severe mitral valve regurgitation and severe aortic valve stenosis.  Her oxygen saturation on room was 78%  New Bilateral infiltrates on her chest x-ray 2018 consistent with CHF  Diabetes   Hypertension x 15 years      PAST SURGICAL HISTORY:  No past surgical history.      MEDICATIONS  (STANDING):  ALBUTerol    0.083% 2.5 milliGRAM(s) Nebulizer every 6 hours  dextrose 5%. 1000 milliLiter(s) (50 mL/Hr) IV Continuous <Continuous>  dextrose 50% Injectable 12.5 Gram(s) IV Push once  insulin lispro (HumaLOG) corrective regimen sliding scale   SubCutaneous every 6 hours  metoprolol tartrate 25 milliGRAM(s) Oral two times a day  pantoprazole Infusion 8 mG/Hr (10 mL/Hr) IV Continuous <Continuous>  piperacillin/tazobactam IVPB. 3.375 Gram(s) IV Intermittent every 8 hours  sodium chloride 0.9% lock flush 3 milliLiter(s) IV Push every 8 hours  vancomycin  IVPB 750 milliGRAM(s) IV Intermittent every 12 hours  vancomycin  IVPB        MEDICATIONS  (PRN):  dextrose Gel 1 Dose(s) Oral once PRN Blood Glucose LESS THAN 70 milliGRAM(s)/deciliter  glucagon  Injectable 1 milliGRAM(s) IntraMuscular once PRN Glucose LESS THAN 70 milligrams/deciliter  ondansetron Injectable 4 milliGRAM(s) IV Push every 6 hours PRN Nausea      Allergies:     No Known Drug Allergies                          SOCIAL HISTORY:      She says she quit smoking 2 weeks ago after smoking 1.5 PPD x 60 years,                                  She doesn't use illicit drugs.                        9.3    12.6  )-----------( 448      ( 2018 02:58 )             28.6       PT/INR - ( 2018 23:43 )   PT: 12.7 sec;   INR: 1.15 ratio         PTT - ( 2018 23:43 )  PTT:27.8 sec    04-30    129<L>  |  94<L>  |  17.0  ----------------------------<  234<H>  3.6   |  21.0<L>  |  1.09    Ca    8.3<L>      2018 02:58  Phos  2.6       Mg     2.1         TPro  5.9<L>  /  Alb  2.9<L>  /  TBili  0.4  /  DBili  x   /  AST  74<H>  /  ALT  17  /  AlkPhos  60      [id number]    Normal sinus rhythm  Left ventricular hypertrophy with repolarization abnormality    CHEST X-RAY  -  2018  FINDINGS:  Single frontal view of the chest demonstrates new bilateral lower lobe   infiltrates/effusions. Numerous tiny bilateral pulmonary granulomas. The   cardiomediastinal silhouette is normal. No acute osseous abnormalities.   Overlying EKG leads and wires are noted.  IMPRESSION: New bilateral lower lobe infiltrates/effusions. Differential   diagnosis includes CHF.    TT ECHO:   2018   5. Left ventricular ejection fraction, by visual estimation, is 30 to   35%.   6. Severe mitral valve regurgitation.   7. Multiple left ventricular regional wall motion abnormalities exist.   See wall motion findings.   8. Elevated left atrial and left ventricular end-diastolic pressures.   9. There is mild aortic root calcification.  10. The mitral valve leaflets are tethered which is due to reduced   systolic function and elevated LVEDP.  11. Sclerotic aortic valve with decreased opening.  12. Limited information on aortic valve stenosis. Severely calcified   aortic valve. Stroke volume across LVOT is 29 ml. DI= 0.25, Low flow-low   gradient severe aortic valve stenosis vs. Pseudo severe aortic valve   stenosis due to low LVEF. Cardiology consultation is advised.  13. Moderate tricuspid regurgitation.  14. Estimated pulmonary artery systolic pressure is 48.4 mmHg assuming a   right atrial pressure of 3 mmHg, which is consistent with mild pulmonary   hypertension. IVC was not visualized. RAP was estimated at 3 mmH    ASA # = 4    Mallampati # = 3-4       Impression:  The patient needs to be optimized before she can                     be safely anesthetized.

## 2018-05-01 NOTE — CONSULT NOTE ADULT - ATTENDING COMMENTS
Pt seen and examined, NYHA 4. She has severe MR and possibly severe AS.   LVEF is diminshed.  She will need further GI workup but due to symptoms, she is not cleared.  She will start on diuretic therapy.  Start HF meds  In the future, she will need JEFF, cardiac cath and dobutamine stress echo to exclude pseudo AS.   She will need high degree of care on 4T.   I agree with a/p.

## 2018-05-01 NOTE — PROGRESS NOTE ADULT - PROBLEM SELECTOR PLAN 1
No further GI bleeding. Pt. would need Pulmonary and Cardiac clearances for EGD when her respiratory status stabilizes. Continue current Pantoprazole therapy with diet advancement as tolerated. repeat labs ordered for tomorrow AM.

## 2018-05-01 NOTE — PROGRESS NOTE ADULT - ASSESSMENT
76 y/o female who lives at home and uses walker. She has home health aides and hx of HTN, OA, DM-2. Brought in due to increasing generalized weakness, and fall at home.  Admits to poor appetite. Admits to weight loss but can't quantify amount.  Patient is poor historian and intermittently is oriented to person/place. In ED patient found to have Hbg of 6.5 with no prior for comparison and had black stool on exam which was guaiac positive. EKG with subtle ST depression in lateral leads but neg trop and normal BP.     Acute hypoxic respiratory failure - Continue O2 supplementation.  CXR with bilateral effusions/infiltrates.  TTE with EF 30-35%; severe MR; severely calcified aortic valve; mod TR.  Started on Vancomycin/Zosyn for presumed pneumonia.  Follow up Blood cultures.  Will check procalcitonin.  Repeat CXR PA/Lat.    Acute systolic HF - Cardiology consulted.  Continue IV Lasix 40mg BID. Monitor daily weights.    Anemia - Status post transfusion of packed red blood cells with improvement in blood count.    Gastrointestinal hemorrhage - Discussed with Gastroenterology, planned for upper endoscopy when stable. Diclofenac discontinued on admission. On pantoprazole.    Hyponatremia - Hydrochlorothiazide discontinued on admission. Potassium supplementation for hypokalemia.    Hypertension - Close blood pressure monitoring. On metoprolol. Murmur noted on examination which the patient noting that it has been present for many years. TTE pending.    Diabetes - Insulin coverage, close monitoring of blood glucose levels. 76 y/o female who lives at home and uses walker. She has home health aides and hx of HTN, OA, DM-2. Brought in due to increasing generalized weakness, and fall at home.  Admits to poor appetite. Admits to weight loss but can't quantify amount.  Patient is poor historian and intermittently is oriented to person/place. In ED patient found to have Hbg of 6.5 with no prior for comparison and had black stool on exam which was guaiac positive. EKG with subtle ST depression in lateral leads but neg trop and normal BP.     Acute hypoxic respiratory failure - Continue O2 supplementation.  CXR with bilateral effusions/infiltrates.  TTE with EF 30-35%; severe MR; severely calcified aortic valve; mod TR.  Started on Vancomycin/Zosyn for presumed pneumonia.  Follow up Blood cultures.  Will check procalcitonin.  Repeat CXR PA/Lat.    Acute systolic HF - Cardiology consulted.  Continue IV Lasix 40mg BID. Monitor daily weights.    Anemia - Status post transfusion of packed red blood cells with improvement in blood count.    Gastrointestinal hemorrhage - Discussed with Gastroenterology, planned for upper endoscopy when stable. Diclofenac discontinued on admission. On pantoprazole.    Hyponatremia - Hydrochlorothiazide discontinued on admission. Potassium supplementation for hypokalemia.    Hypertension - Close blood pressure monitoring. On metoprolol.     Diabetes - Insulin coverage, close monitoring of blood glucose levels.

## 2018-05-01 NOTE — PROGRESS NOTE ADULT - SUBJECTIVE AND OBJECTIVE BOX
Pt seen and examined. She still appears tachypneic but less so than yesterday. No further GI bleeding or melenotic BM's.     REVIEW OF SYSTEMS:  Constitutional: No fever, weight loss or fatigue  Cardiovascular: No chest pain, palpitations, dizziness or leg swelling  Gastrointestinal: No abdominal or epigastric pain. No nausea, vomiting or hematemesis; No diarrhea or constipation. No melena or hematochezia.  Skin: No itching, burning, rashes or lesions       MEDICATIONS:  MEDICATIONS  (STANDING):  ALBUTerol    0.083% 2.5 milliGRAM(s) Nebulizer every 6 hours  dextrose 5%. 1000 milliLiter(s) (50 mL/Hr) IV Continuous <Continuous>  dextrose 50% Injectable 12.5 Gram(s) IV Push once  insulin lispro (HumaLOG) corrective regimen sliding scale   SubCutaneous every 6 hours  metoprolol tartrate 25 milliGRAM(s) Oral two times a day  pantoprazole Infusion 8 mG/Hr (10 mL/Hr) IV Continuous <Continuous>  piperacillin/tazobactam IVPB. 3.375 Gram(s) IV Intermittent every 8 hours  sodium chloride 0.9% lock flush 3 milliLiter(s) IV Push every 8 hours  vancomycin  IVPB 750 milliGRAM(s) IV Intermittent every 12 hours  vancomycin  IVPB        MEDICATIONS  (PRN):  dextrose Gel 1 Dose(s) Oral once PRN Blood Glucose LESS THAN 70 milliGRAM(s)/deciliter  glucagon  Injectable 1 milliGRAM(s) IntraMuscular once PRN Glucose LESS THAN 70 milligrams/deciliter  ondansetron Injectable 4 milliGRAM(s) IV Push every 6 hours PRN Nausea      Allergies    No Known Allergies    Intolerances        Vital Signs Last 24 Hrs  T(C): 36.7 (01 May 2018 07:56), Max: 37 (30 Apr 2018 15:00)  T(F): 98.1 (01 May 2018 07:56), Max: 98.6 (30 Apr 2018 15:00)  HR: 77 (01 May 2018 07:56) (68 - 104)  BP: 105/62 (01 May 2018 07:56) (96/48 - 139/63)  BP(mean): --  RR: 22 (01 May 2018 07:56) (19 - 22)  SpO2: 96% (01 May 2018 07:56) (90% - 100%)    04-30 @ 07:01  -  05-01 @ 07:00  --------------------------------------------------------  IN: 360 mL / OUT: 2 mL / NET: 358 mL        PHYSICAL EXAM:    General: Well developed; well nourished; in no acute distress  HEENT: MMM, conjunctiva pink and sclera anicteric.  Lungs: Decreased breath sounds bilaterally  Cor: RRR S1, S2 only.  Gastrointestinal: Abdomen: Soft non-tender non-distended; Normal bowel sounds; No hepatosplenomegaly  Extremities: no cyanosis, clubbing or edema.  Skin: Warm and dry. No obvious rash  Neuro: Pt. a + o x 3    LABS:      CBC Full  -  ( 30 Apr 2018 02:58 )  WBC Count : 12.6 K/uL  Hemoglobin : 9.3 g/dL  Hematocrit : 28.6 %  Platelet Count - Automated : 448 K/uL  Mean Cell Volume : 84.9 fl  Mean Cell Hemoglobin : 27.6 pg  Mean Cell Hemoglobin Concentration : 32.5 g/dL  Auto Neutrophil # : x  Auto Lymphocyte # : x  Auto Monocyte # : x  Auto Eosinophil # : x  Auto Basophil # : x  Auto Neutrophil % : x  Auto Lymphocyte % : x  Auto Monocyte % : x  Auto Eosinophil % : x  Auto Basophil % : x    04-30    129<L>  |  94<L>  |  17.0  ----------------------------<  234<H>  3.6   |  21.0<L>  |  1.09    Ca    8.3<L>      30 Apr 2018 02:58  Phos  2.6     04-29  Mg     2.1     04-29    TPro  5.9<L>  /  Alb  2.9<L>  /  TBili  0.4  /  DBili  x   /  AST  74<H>  /  ALT  17  /  AlkPhos  60  04-30                      RADIOLOGY & ADDITIONAL STUDIES (The following images were personally reviewed):

## 2018-05-01 NOTE — PROGRESS NOTE ADULT - SUBJECTIVE AND OBJECTIVE BOX
CC: weakness, fall     HPI:  76 y/o female who lives at home and uses walker. She has home health aides and hx of HTN, OA, DM-2. Brought in due to increasing generalized weakness, and fall at home.  Admits to poor appetite. Admits to weight loss but can't quantify amount.  Patient is poor historian and intermittently is oriented to person/place. In ED patient found to have Hbg of 6.5 with no prior for comparison and had black stool on exam which was guaiac positive. EKG with subtle ST depression in lateral leads but neg trop and normal BP.     INTERVAL HPI/OVERNIGHT EVENTS:  Patient seen and examined lying in bed.  Patient complaining SOB requiring face mask O2.  Patient denies any headache, dizziness, cough, CP, abdominal pain, nausea, vomiting, dysuria.  Other ROS reviewed and are negative.    Vital Signs Last 24 Hrs  T(C): 36.7 (01 May 2018 07:56), Max: 37 (2018 15:00)  T(F): 98.1 (01 May 2018 07:56), Max: 98.6 (2018 15:00)  HR: 77 (01 May 2018 07:56) (77 - 104)  BP: 105/62 (01 May 2018 07:56) (101/57 - 139/63)  BP(mean): --  RR: 22 (01 May 2018 07:56) (19 - 22)  SpO2: 96% (01 May 2018 07:56) (90% - 100%)  I&O's Detail    2018 07:01  -  01 May 2018 07:00  --------------------------------------------------------  IN:    Oral Fluid: 360 mL  Total IN: 360 mL    OUT:    Voided: 2 mL  Total OUT: 2 mL    Total NET: 358 mL    PHYSICAL EXAM:  GENERAL: NAD, well-groomed, well-developed  HEAD:  Atraumatic, Normocephalic  NECK: Supple, No JVD, Normal thyroid  NERVOUS SYSTEM:  Alert, forgetfulness, Good concentration; generalized weakness  CHEST/LUNG: Diminished BS bilaterally with crackles in bases  HEART: Regular rate and rhythm; No murmurs, rubs, or gallops  ABDOMEN: Soft, Nontender, Nondistended; Bowel sounds present  EXTREMITIES:  2+ Peripheral Pulses, No clubbing, cyanosis, or edema                                  8.3    13.8  )-----------( 383      ( 01 May 2018 07:19 )             25.2     01 May 2018 07:19    130    |  94     |  17.0   ----------------------------<  165    4.0     |  21.0   |  1.14     Ca    8.2        01 May 2018 07:19    TPro  5.9    /  Alb  2.9    /  TBili  0.4    /  DBili  x      /  AST  74     /  ALT  17     /  AlkPhos  60     2018 02:58      CAPILLARY BLOOD GLUCOSE  POCT Blood Glucose.: 188 mg/dL (01 May 2018 11:25)  POCT Blood Glucose.: 188 mg/dL (01 May 2018 06:27)  POCT Blood Glucose.: 192 mg/dL (2018 23:17)  POCT Blood Glucose.: 211 mg/dL (2018 18:27)    LIVER FUNCTIONS - ( 2018 02:58 )  Alb: 2.9 g/dL / Pro: 5.9 g/dL / ALK PHOS: 60 U/L / ALT: 17 U/L / AST: 74 U/L / GGT: x             Hemoglobin A1C, Whole Blood: 5.0 % (18 @ 09:04)    MEDICATIONS  (STANDING):  ALBUTerol    0.083% 2.5 milliGRAM(s) Nebulizer every 6 hours  dextrose 5%. 1000 milliLiter(s) (50 mL/Hr) IV Continuous <Continuous>  dextrose 50% Injectable 12.5 Gram(s) IV Push once  furosemide   Injectable 40 milliGRAM(s) IV Push every 12 hours  insulin lispro (HumaLOG) corrective regimen sliding scale   SubCutaneous every 6 hours  metoprolol tartrate 25 milliGRAM(s) Oral two times a day  pantoprazole Infusion 8 mG/Hr (10 mL/Hr) IV Continuous <Continuous>  piperacillin/tazobactam IVPB. 3.375 Gram(s) IV Intermittent every 8 hours  sodium chloride 0.9% lock flush 3 milliLiter(s) IV Push every 8 hours  vancomycin  IVPB 750 milliGRAM(s) IV Intermittent every 12 hours  vancomycin  IVPB        MEDICATIONS  (PRN):  dextrose Gel 1 Dose(s) Oral once PRN Blood Glucose LESS THAN 70 milliGRAM(s)/deciliter  glucagon  Injectable 1 milliGRAM(s) IntraMuscular once PRN Glucose LESS THAN 70 milligrams/deciliter  ondansetron Injectable 4 milliGRAM(s) IV Push every 6 hours PRN Nausea      < from: TTE Echo Complete w/Doppler (18 @ 15:13) >  EXAM:  ECHO TRANSTHORACIC COMP W DOPP      PROCEDURE DATE:  2018   .      INTERPRETATION:  REPORT:    TRANSTHORACIC ECHOCARDIOGRAM REPORT         Patient Name:   MELIDA SNOWDEN Patient Location: Inpatient  Medical Rec #:  [id number]         Accession #:      [id number]  Account #:                       Height:           66.1 in 168.0 cm  YOB: 1940        Weight:           138.9 lb 63.00 kg  Patient Age:    77 years         BSA:              1.72 m²  Patient Gender: F        BP:               134/63 mmHg       Date of Exam:        2018 3:13:02 PM  Sonographer:         Leland Reyes  Referring Physician: Dulce Ramsey MD    Procedure:     2D Echo/Doppler/Color Doppler Complete.  Indications:   Cardiac murmur, unspecified - R01.1  Diagnosis:     Cardiac murmur, unspecified - R01.1  Study Details: Technically difficult study. Study quality was adversely   affected                 due to the patient being uncooperative and lung   interference.         2DAND M-MODE MEASUREMENTS (normal ranges within parentheses):  Left                 Normal   Aorta/Left            Normal  Ventricle:                    Atrium:  IVSd (2D):    1.04  (0.7-1.1) Left Atrium  3.71 cm (1.9-4.0)                 cm (2D):  LVPWd (2D):   0.98  (0.7-1.1) LA Volume     30.2                 cm             Index         ml/m²  LVIDd (2D):   4.82  (3.4-5.7)                 cm  LVIDs (2D):   4.02                 cm  LV FS (2D):   16.6   (>25%)                  %  LV EF (2D):   35 %   (>55%)  Relative Wall 0.41   (<0.42)  Thickness    LV SYSTOLIC FUNCTION BY 2D PLANIMETRY (MOD):  EF-Biplane: 33 %    LV DIASTOLIC FUNCTION:  MV Peak E: 1.46 m/s E/e' Ratio: 27.50  MV Peak A: 1.15 m/s  E/A Ratio: 1.27    SPECTRAL DOPPLER ANALYSIS (where applicable):  Mitral Insufficiency by PISA:  MR Volume: 35.06 ml MR Flow Rate: 133.31 ml/s MR EROA: 23.85 mm²    Aortic Valve: AoV Max Semaj: 2.68 m/s AoV Peak P.8 mmHg AoV Mean PG:   15.3 mmHg    LVOT Vmax: 0.67 m/s LVOT VTI: 0.101 m LVOT Diameter: 1.90 cm    AoV Area, Vmax: 0.70 cm² AoV Area, VTI: 0.62 cm² AoV Area, Vmn: 0.66 cm²  Ao VTI: 0.459  Tricuspid Valve and PA/RV Systolic Pressure: TR Max Velocity: 3.37 m/s RA   Pressure: 3 mmHg RVSP/PASP: 48.4 mmHg       PHYSICIAN INTERPRETATION:  Left Ventricle: Endocardial visualization was enhanced with intravenous   echo contrast.  Left ventricular ejection fraction, by visual estimation, is 30 to 35%.   Moderately to severely decreased segmental left ventricular systolic   function. The left ventricular diastolic function could not be assessed   in this study. Elevated left atrial and left ventricular end-diastolic   pressures.  LV Wall Scoring:  The entire inferior wall, mid and apical anterior septum, mid and apical  inferior septum, apical anterior segment, and apex are akinetic. The mid  anterior segment is hypokinetic.    Right Ventricle: The right ventricular size is normal. RV systolic   function is mildly reduced.  Left Atrium: Mildly enlarged left atrium.  Right Atrium: Normal right atrial size.  Pericardium: There is no evidence of pericardial effusion.  Mitral Valve: The mitral valve is normal in structure. The mitral valve   leaflets are tethered which is due to reduced systolic function and   elevated LVDP. There is mild mitral annular calcification. Severe mitral   valve regurgitation is seen.  Tricuspid Valve: The tricuspid valve is normal in structure. Moderate   tricuspid regurgitation is visualized. Estimated pulmonary artery   systolic pressure is 48.4 mmHg assuming a right atrial pressure of 3   mmHg, which is consistent with mild pulmonary hypertension.  Aortic Valve: Sclerotic aortic valve with decreased opening. Trivial   aortic valve regurgitation is seen. Limited information on aortic valve   stenosis. Severely calcified aortic valve. Stroke volume across LVOT is   29 ml. DI= 0.25, Low flow-low gradient severe aortic valve stenosis vs.   Pseudo severe aortic valve stenosis due to low LVEF. Cardiology   consultation is advised.  Pulmonic Valve: The pulmonic valve was not well visualized.  Aorta: Aortic root measured at Sinus of Valsalva is normal. There is mild   aortic root calcification.  Pulmonary Artery: The pulmonary artery is not well seen.  Venous: The inferior vena cava is not well visualized.  In comparison to the previous echocardiogram(s): There are no prior   studies on this patient for comparison purposes.       Summary:   1. Technically difficult study.   2. Endocardial visualization was enhanced with intravenous echo contrast.   3. Patient was uncooperative and stopped the test prematurely.   4. Moderately to severely decreased segmental left ventricular systolic   function.   5. Left ventricular ejection fraction, by visual estimation, is 30 to   35%.   6. Severe mitral valve regurgitation.   7. Multiple left ventricular regional wall motion abnormalities exist.   See wall motion findings.   8. Elevated left atrial and left ventricular end-diastolic pressures.   9. There is mild aortic root calcification.  10. The mitral valve leaflets are tethered which is due to reduced   systolic function and elevated LVEDP.  11. Sclerotic aortic valve with decreased opening.  12. Limited information on aortic valve stenosis. Severely calcified   aortic valve. Stroke volume across LVOT is 29 ml. DI= 0.25, Low flow-low   gradient severe aortic valve stenosis vs. Pseudo severe aortic valve   stenosis due to low LVEF. Cardiology consultation is advised.  13. Moderate tricuspid regurgitation.  14. Estimated pulmonary artery systolic pressure is 48.4 mmHg assuming a   right atrial pressure of 3 mmHg, which is consistent with mild pulmonary   hypertension. IVC was not visualized. RAP was estimated at 3 mmHg.  15. There is no evidence of pericardial effusion.  16. Finding were D/W Dr. Ashley Cristina.    [id number] Marvin Boucher MD, Electronically signed on 2018 at 6:10:38 PM         *** Final ***                  MARVIN BOUCHER   This document has been electronically signed. 2018  3:13PM          < end of copied text >

## 2018-05-01 NOTE — CONSULT NOTE ADULT - ASSESSMENT
GI Bleeding with melena and recent start on NSAID, hemodynamically stable.  Suspicious for UGI bleeding, PUD.  No hx to suggest portal Htn.    Agree with IV hydration.  Adequate transfusions to keep Hgb>8.  IVPPI med.  NGT to LIPS.  Serial H/H.  Eventual EGD.  Cardiology eval for hx of heart murmur.  Will follow.  If unstable then transfer to ICU.
6 y/o female who lives at home  present to ED for a fall at home. Patient present to ED with Hbg of 6.5 and guaiac positive stool.  EKG with subtle ST depression in lateral leads but neg trop and normal BP.   Seen by GI and requesting cardiac clearance for an Endoscopy.  TTE with Sever MR and AS VS Pseudo AS due to low volume.   CXr with new CHF.      Acute systolic HF -  Transfer to Telemetery/Pulse Oxygen,  D.C Lasix, Bumex .5mg/hr, potassium 40 po daily, strict I and O's, Monitor daily weights, K > 4, Mg > 2.     MR/Aortic valve dysfunction:   Repeat JEFF, stress ecko for AS, futrue plans for possible mitral valuve clip and Tavr when clinical stable.      Gastrointestinal hemorrhage -   Plan for upper endoscopy when stable, Euvolemia.  Diclofenac discontinued on admission. On pantoprazole, GI consulted to follow    Hyponatremia -  Daily labs, Potassium supplementation for hypokalemia.    Hypertension - Close blood pressure monitoring. On metoprolol.  Lisinopril 2.5 po daily qhs, hold for sbp < 100

## 2018-05-02 NOTE — PROGRESS NOTE ADULT - ASSESSMENT
Patient with GI bleeding, most likely related to NSAID use, now seems to have stopped. However, due to acute cardiopulmonary status change, will recommend to hold EGD  Will need cardiology and pulmonary clearance before EGD  Can change PPI infusion to bid  Can advance to full liquid diet

## 2018-05-02 NOTE — PROGRESS NOTE ADULT - SUBJECTIVE AND OBJECTIVE BOX
INTERVAL HPI/OVERNIGHT EVENTS:Patient FU for anemia, melanotic stools. Now with acute hypoxic resp failure, on bumetnide infusion with I/O monitoring. On PPI infusion. Had one dark colored bowel movement. Hct stable. On vancomycin and zosyn    MEDICATIONS  (STANDING):  ALBUTerol    0.083% 2.5 milliGRAM(s) Nebulizer every 6 hours  buMETAnide Infusion 0.5 mG/Hr (5 mL/Hr) IV Continuous <Continuous>  dextrose 5%. 1000 milliLiter(s) (50 mL/Hr) IV Continuous <Continuous>  dextrose 50% Injectable 12.5 Gram(s) IV Push once  insulin lispro (HumaLOG) corrective regimen sliding scale   SubCutaneous every 6 hours  lisinopril 2.5 milliGRAM(s) Oral daily  metoprolol tartrate 25 milliGRAM(s) Oral two times a day  pantoprazole Infusion 8 mG/Hr (10 mL/Hr) IV Continuous <Continuous>  piperacillin/tazobactam IVPB. 3.375 Gram(s) IV Intermittent every 8 hours  potassium chloride   Powder 40 milliEquivalent(s) Oral daily  sodium chloride 0.9% lock flush 3 milliLiter(s) IV Push every 8 hours  vancomycin  IVPB 750 milliGRAM(s) IV Intermittent every 12 hours  vancomycin  IVPB        MEDICATIONS  (PRN):  dextrose Gel 1 Dose(s) Oral once PRN Blood Glucose LESS THAN 70 milliGRAM(s)/deciliter  glucagon  Injectable 1 milliGRAM(s) IntraMuscular once PRN Glucose LESS THAN 70 milligrams/deciliter  ondansetron Injectable 4 milliGRAM(s) IV Push every 6 hours PRN Nausea      Allergies    No Known Allergies    Intolerances        Vital Signs Last 24 Hrs  T(C): 36.6 (02 May 2018 05:22), Max: 37.1 (01 May 2018 16:02)  T(F): 97.8 (02 May 2018 05:22), Max: 98.8 (01 May 2018 16:02)  HR: 95 (02 May 2018 05:22) (77 - 104)  BP: 110/60 (02 May 2018 05:22) (100/56 - 113/-)  BP(mean): --  RR: 18 (02 May 2018 05:22) (16 - 55)  SpO2: 97% (02 May 2018 05:22) (92% - 100%)    LABS:                        8.2    12.3  )-----------( 419      ( 02 May 2018 05:46 )             24.1     05-02    134<L>  |  93<L>  |  18.0  ----------------------------<  122<H>  3.5   |  27.0  |  1.33<H>    Ca    8.5<L>      02 May 2018 05:46  Phos  2.3     05-02  Mg     1.3     05-02            RADIOLOGY & ADDITIONAL TESTS:< from: Xray Chest 2 Views PA/Lat (05.01.18 @ 13:15) >    FINDINGS:   The lungs  show moderate-large bowel bilateral pleural effusions are   layering along posterior thoracic wall. There is vascular congestion and   mild cardiomegaly .  Upper lobes clear.      Visualized osseous structures are intact.        IMPRESSION:   Increased bilateral effusions..          < end of copied text >  < from: TTE Echo Complete w/Doppler (04.30.18 @ 15:13) >    Summary:   1. Technically difficult study.   2. Endocardial visualization was enhanced with intravenous echo contrast.   3. Patient was uncooperative and stopped the test prematurely.   4. Moderately to severely decreased segmental left ventricular systolic   function.   5. Left ventricular ejection fraction, by visual estimation, is 30 to   35%.   6. Severe mitral valve regurgitation.   7. Multiple left ventricular regional wall motion abnormalities exist.   See wall motion findings.   8. Elevated left atrial and left ventricular end-diastolic pressures.   9. There is mild aortic root calcification.  10. The mitral valve leaflets are tethered which is due to reduced   systolic function and elevated LVEDP.  11. Sclerotic aortic valve with decreased opening.  12. Limited information on aortic valve stenosis. Severely calcified   aortic valve. Stroke volume across LVOT is 29 ml. DI= 0.25, Low flow-low   gradient severe aortic valve stenosis vs. Pseudo severe aortic valve   stenosis due to low LVEF. Cardiology consultation is advised.  13. Moderate tricuspid regurgitation.  14. Estimated pulmonary artery systolic pressure is 48.4 mmHg assuming a   right atrial pressure of 3 mmHg, which is consistent with mild pulmonary   hypertension. IVC was not visualized. RAP was estimated at 3 mmHg.  15. There is no evidence of pericardial effusion.  16. Finding were D/W Dr. Ashley Cristina.    < end of copied text >

## 2018-05-02 NOTE — PROGRESS NOTE ADULT - SUBJECTIVE AND OBJECTIVE BOX
CHIEF COMPLAINT:Patient is a 77y old  Female who presents with a chief complaint of weakness, fall (28 Apr 2018 01:55)  pt with HFrEF, MR.   She is on bumex drip. diuresing and generally doing better.  One dark color stool. HCt stable.     Allergies  No Known Allergies  	  MEDICATIONS:  buMETAnide Infusion 0.5 mG/Hr IV Continuous <Continuous>  lisinopril 2.5 milliGRAM(s) Oral daily  metoprolol tartrate 25 milliGRAM(s) Oral two times a day  piperacillin/tazobactam IVPB. 3.375 Gram(s) IV Intermittent every 8 hours  vancomycin  IVPB 500 milliGRAM(s) IV Intermittent every 12 hours  ALBUTerol    0.083% 2.5 milliGRAM(s) Nebulizer every 6 hours  ondansetron Injectable 4 milliGRAM(s) IV Push every 6 hours PRN  pantoprazole  Injectable 40 milliGRAM(s) IV Push two times a day  dextrose 50% Injectable 12.5 Gram(s) IV Push once  dextrose Gel 1 Dose(s) Oral once PRN  glucagon  Injectable 1 milliGRAM(s) IntraMuscular once PRN  insulin lispro (HumaLOG) corrective regimen sliding scale   SubCutaneous every 6 hours  dextrose 5%. 1000 milliLiter(s) IV Continuous <Continuous>  potassium chloride   Powder 40 milliEquivalent(s) Oral daily  sodium chloride 0.9% lock flush 3 milliLiter(s) IV Push every 8 hours    PHYSICAL EXAM:  T(C): 36.7 (05-02-18 @ 10:00), Max: 37.1 (05-01-18 @ 16:02)  HR: 95 (05-02-18 @ 10:00) (88 - 104)  BP: 95/51 (05-02-18 @ 10:00) (95/51 - 113/-)  RR: 17 (05-02-18 @ 10:00) (16 - 55)  SpO2: 97% (05-02-18 @ 05:22) (92% - 98%)  01 May 2018 07:01  -  02 May 2018 07:00  --------------------------------------------------------  IN: 195 mL / OUT: 1450 mL / NET: -1255 mL    02 May 2018 07:01  -  02 May 2018 14:51  --------------------------------------------------------  IN: 480 mL / OUT: 800 mL / NET: -320 mL  Appearance: Normal	  HEENT:   NC/AT  Eye: Pink Conjunctiva  Lungs: decrease air entry at bases.   CVS: RRR, Normal S1 and S2, 3/6 sm lsb  Pulses: Normal distal pulses.  Neuro: A&O x3    LABS:	 	                         8.2    12.3  )-----------( 419      ( 02 May 2018 05:46 )             24.1     05-02    134<L>  |  93<L>  |  18.0  ----------------------------<  122<H>  3.5   |  27.0  |  1.33<H>    Ca    8.5<L>      02 May 2018 05:46  Phos  2.3     05-02  Mg     1.3     05-02      proBNP:   Lipid Profile:   HgA1c:   TSH:     ASSESSMENT/PLAN:

## 2018-05-02 NOTE — CHART NOTE - NSCHARTNOTEFT_GEN_A_CORE
Source: Patient [x ]  Family [ ]   other [ ]    Pt s/p GI bleed, fell at home.    Current Diet: Diet, Full Liquid (05-02-18 @ 07:37)    PO intake:  Pt reports fair po intake at meals- taking small amount of full liquids for lunch    Current Weight: Pt states her UBW is ~130#.  Pt reports losing 8-10# over the past month.  (5/2) 120#  (4/30) 132#    % Weight Change - wt loss noted, question accuracy of admission wt    Pertinent Medications: MEDICATIONS  (STANDING):  ALBUTerol    0.083% 2.5 milliGRAM(s) Nebulizer every 6 hours  buMETAnide Infusion 0.5 mG/Hr (5 mL/Hr) IV Continuous <Continuous>  dextrose 5%. 1000 milliLiter(s) (50 mL/Hr) IV Continuous <Continuous>  dextrose 50% Injectable 12.5 Gram(s) IV Push once  insulin lispro (HumaLOG) corrective regimen sliding scale   SubCutaneous every 6 hours  lisinopril 2.5 milliGRAM(s) Oral daily  metoprolol tartrate 25 milliGRAM(s) Oral two times a day  pantoprazole  Injectable 40 milliGRAM(s) IV Push two times a day  piperacillin/tazobactam IVPB. 3.375 Gram(s) IV Intermittent every 8 hours  potassium chloride   Powder 40 milliEquivalent(s) Oral daily  sodium chloride 0.9% lock flush 3 milliLiter(s) IV Push every 8 hours  vancomycin  IVPB 500 milliGRAM(s) IV Intermittent every 12 hours    MEDICATIONS  (PRN):  dextrose Gel 1 Dose(s) Oral once PRN Blood Glucose LESS THAN 70 milliGRAM(s)/deciliter  glucagon  Injectable 1 milliGRAM(s) IntraMuscular once PRN Glucose LESS THAN 70 milligrams/deciliter  ondansetron Injectable 4 milliGRAM(s) IV Push every 6 hours PRN Nausea    Pertinent Labs: CBC Full  -  ( 02 May 2018 05:46 )  WBC Count : 12.3 K/uL  Hemoglobin : 8.2 g/dL  Hematocrit : 24.1 %  Platelet Count - Automated : 419 K/uL  Mean Cell Volume : 84.3 fl  Mean Cell Hemoglobin : 28.7 pg  Mean Cell Hemoglobin Concentration : 34.0 g/dL  Auto Neutrophil # : 8.8 K/uL  Auto Lymphocyte # : 2.0 K/uL  Auto Monocyte # : 1.4 K/uL  Auto Eosinophil # : 0.0 K/uL  Auto Basophil # : 0.0 K/uL  Auto Neutrophil % : 71.6 %  Auto Lymphocyte % : 16.6 %  Auto Monocyte % : 11.0 %  Auto Eosinophil % : 0.2 %  Auto Basophil % : 0.1 %  05-02 Na134 mmol/L<L> Glu 122 mg/dL<H> K+ 3.5 mmol/L Cr  1.33 mg/dL<H> BUN 18.0 mg/dL Phos 2.3 mg/dL<L> Alb n/a   PAB n/a       Skin: no skin breakdown noted    Nutrition focused physical exam conducted - found signs of malnutrition [ ]absent [x ]present    Subcutaneous fat loss: [ ] Orbital fat pads region, [ ]Buccal fat region, [ ]Triceps region,  [ ]Ribs region    Muscle wasting: [x ]Temples region, [x ]Clavicle region, [x ]Shoulder region, [ ]Scapula region, [ ]Interosseous region,  [ ]thigh region, [ ]Calf region    Estimated Needs:   [x ] no change since previous assessment  [ ] recalculated:     Current Nutrition Diagnosis: Pt remains at nutrition risk secondary to moderate protein calorie malnutrition (acute) related to inadequate protein energy intake with decreased appetite as evidenced by pt meeting <75% estimated nutrition needs > 7 days and ~8# unintentional wt loss (6%) x 1 month.  Pt also with moderate muscle wasting noted on temples, and mild muscle wasting noted on shoulder and clavicle.    Recommendations:   Advance diet to DASH/TLC when tolerated  Add Ensure clear tid  MVI daily    Monitoring and Evaluation:   [x ] PO intake [x ] Tolerance to diet prescription [X] Weights  [X] Follow up per protocol [X] Labs:

## 2018-05-02 NOTE — PROGRESS NOTE ADULT - ASSESSMENT
1. HFrEF, she will need cardiac cath once more stable  2. TTE in the future with MR  3. HCt stable.  4. Change to long acting BB with HF  5. Cont with diuretics

## 2018-05-02 NOTE — PROGRESS NOTE ADULT - SUBJECTIVE AND OBJECTIVE BOX
MELIDA SNOWDEN    [id number]    77y      Female    INTERVAL HPI/OVERNIGHT EVENTS: Offers no complaints    Hospital course:  78 y/o female who lives at home and uses walker. She has home health aides and hx of HTN, OA, DM-2. Brought in due to increasing generalized weakness, and fall at home.  Admits to poor appetite. Admits to weight loss but can't quantify amount.  Patient is poor historian and intermittently is oriented to person/place. In ED patient found to have Hbg of 6.5 with no prior for comparison and had black stool on exam which was guaiac positive. EKG with subtle ST depression in lateral leads but neg trop and normal BP. Pt received 3u PRBC then developed pulmonary edema. TTE showed EF 30-35%, severe MR, severe AS. Pt transferred to [facility] for bumex infusion.       REVIEW OF SYSTEMS:    CONSTITUTIONAL: No fever   RESPIRATORY: No cough  CARDIOVASCULAR: No chest pain       Vital Signs Last 24 Hrs  T(C): 36.7 (02 May 2018 10:00), Max: 37.1 (01 May 2018 16:02)  T(F): 98.1 (02 May 2018 10:00), Max: 98.8 (01 May 2018 16:02)  HR: 95 (02 May 2018 10:00) (88 - 104)  BP: 95/51 (02 May 2018 10:00) (95/51 - 113/-)  BP(mean): --  RR: 17 (02 May 2018 10:00) (16 - 55)  SpO2: 97% (02 May 2018 05:22) (92% - 100%) 4LNC    PHYSICAL EXAM:    GENERAL: NAD, sitting in chair, able to speak in full sentences  HEENT: PERRL, +EOMI, poor dentition, MMM  CHEST/LUNG: bibasilar fine rales  HEART: S1S2 +murmur  ABDOMEN: Soft, Nontender, Nondistended; Bowel sounds present  EXTREMITIES:  no edema    LABS:                        8.2    12.3  )-----------( 419      ( 02 May 2018 05:46 )             24.1     05-02    134<L>  |  93<L>  |  18.0  ----------------------------<  122<H>  3.5   |  27.0  |  1.33<H>    Ca    8.5<L>      02 May 2018 05:46  Phos  2.3     05-02  Mg     1.3     05-02              MEDICATIONS  (STANDING):  ALBUTerol    0.083% 2.5 milliGRAM(s) Nebulizer every 6 hours  buMETAnide Infusion 0.5 mG/Hr (5 mL/Hr) IV Continuous <Continuous>  dextrose 5%. 1000 milliLiter(s) (50 mL/Hr) IV Continuous <Continuous>  dextrose 50% Injectable 12.5 Gram(s) IV Push once  insulin lispro (HumaLOG) corrective regimen sliding scale   SubCutaneous every 6 hours  lisinopril 2.5 milliGRAM(s) Oral daily  metoprolol tartrate 25 milliGRAM(s) Oral two times a day  pantoprazole  Injectable 40 milliGRAM(s) IV Push two times a day  piperacillin/tazobactam IVPB. 3.375 Gram(s) IV Intermittent every 8 hours  potassium chloride   Powder 40 milliEquivalent(s) Oral daily  sodium chloride 0.9% lock flush 3 milliLiter(s) IV Push every 8 hours  vancomycin  IVPB 750 milliGRAM(s) IV Intermittent every 12 hours  vancomycin  IVPB        MEDICATIONS  (PRN):  dextrose Gel 1 Dose(s) Oral once PRN Blood Glucose LESS THAN 70 milliGRAM(s)/deciliter  glucagon  Injectable 1 milliGRAM(s) IntraMuscular once PRN Glucose LESS THAN 70 milligrams/deciliter  ondansetron Injectable 4 milliGRAM(s) IV Push every 6 hours PRN Nausea      RADIOLOGY & ADDITIONAL TESTS:

## 2018-05-03 NOTE — PROGRESS NOTE ADULT - PROBLEM SELECTOR PLAN 1
With good diuresis with bumex drip  Change to bumex 1 mg bid  Lisinopril 2.5 mg  titrate up if b/p allows  PLAN  Angiogram in the near future  May proceed with Gi work up if labs from today are satisfactory    Continue I & 0  Low dose beta blocker  Low na diet With good diuresis with bumex drip  Change to bumex 1 mg bid  Lisinopril 2.5 mg  titrate up if b/p allows  PLAN  Angiogram in the near future      Continue I & 0  Low dose beta blocker  Low na diet

## 2018-05-03 NOTE — PROGRESS NOTE ADULT - ATTENDING COMMENTS
Reviewed above. Examined pt at bedside. Continue with IV abx for pna. IV diuresis for flash pulmonary edema 2/2 PRBC transfusion. Hold off on EGD until respiratory issues resolved.
Patient seen and examined at the bedside. Agree with the above history, physical, assessment, and plan with the necessary amendments/elaborations below:    clinically stable however still with sob. lung exam with YOLI @ BL airways but not adventitious sounds typically assoc with PNA. also does not have clinical signs/symptoms usual for PNA outside of SOB which Is more likely atrributable to volume overload with component of underlying COPD *(pt admits to being told in past she has COPD, unclear who made the dx, if formal testing was done, she has not seen pulm as outpatient but she does have former smoking hx).     echo reviewed, suboptimal exam given pt inability to tolerate study but signficant reduction in EF to 35% and several mod/severe valvulopathies notes which could very well contribute to her symptoms. agree with deferring egd until cardiac condition is settled. cont empiric abx given elevated procal but will check 2V CXR, to better characterize if pt with infiltrates or effusions, likely to stop abx and do trial without to see how she does depending on imaging study. cardio consult appreciated, will tx to tele. will likely need cath during this admission, will follow up. agree with diuresis, strict i/o, daily weight, goal net 2L neg daily. cont to monitor hgb and watch for signs of continued bleeding
pt was seen and examined. plan of care d/w np in length.   pt was hypotensive possibly over diuresed. She has GIB and will need endoscopy. Transfused. Once more stable, will need w/u with low lvef, demand ischemia and MR.   I agree with a/p.

## 2018-05-03 NOTE — PROGRESS NOTE ADULT - SUBJECTIVE AND OBJECTIVE BOX
Addendum to previous note  Patient is more alert now but very weak  No further melana  still without complaints of cp  Repeat ekg with ischemic appearing t wave inversion in ant/lateral wall    B/p better 110/70 after blood  HR 94 RR15  Lungs few crackles left base cleared with deep breath  Heart s1&s2 regular  Abd soft non tender  Ext no edema  Neuro awake conversing    A/P  76 y/o female with hx of hypertension, Dm, Ashd, systolic CHF, MR aortic stenosis presents with weakness developed melana and required 2 units prbc, currently being treated for Chf and Pnemonia.  Developed Lethargy and hypotension today after a melanic stool.  Event multifactorial      s/p Hypoperfusion Event from hypovolemia  s/p PRBC and showing improvement  Hep lock iv to avoid chf  k and mg being replaced  Labs to be repeated in the am    Cad with ekg changes  Ekg in am  Consider Cath to define anatomy  No antiplatelet meds due to gi bleeding  Low dose beta blocker if b/p allows it  Ace d/kj to be restarted when b/p improves

## 2018-05-03 NOTE — CHART NOTE - NSCHARTNOTEFT_GEN_A_CORE
Rapid response called because patient was sitting and became lethargic  Occurred after patient had a melana stool  sleepy  answering questions  lopez  Patient has no chest discomfort    on arrival HR 96 b/p 100/50  Neck no jvd  Lungs mild exp wheeze  Heart s1&s2 regualr  Abd soft non tender  Ext no edema    Cm no arrhythmia  NSR  EKG  Sinus rhythmn .16/.08/.36  New T wave inversion V1-V6 1, avl    76 y/o female with hx of hypertension, Dm, Ashd, systolic CHF, MR aortic stenosis presents with weakness developed melana and required 2 units prbc, currently being treated for Chf and Pnemonia. S/p rapid response for lethargy, mild hypotension and ekg changes    Lethargy  Mental status is slowly improving  will watch patient closely    CAD  stat tropinin and repeat in 6 hours  Ekg in am  Will d/c Bumex for now  put parameters on beta blocker  Repeat ekg  Hold off on any gi procedures    Hypotension  gently hydration    Discussed with Dr Smith

## 2018-05-03 NOTE — PROGRESS NOTE ADULT - SUBJECTIVE AND OBJECTIVE BOX
MELIDA SNOWDEN    [id number]    77y      Female    INTERVAL HPI/OVERNIGHT EVENTS: Rapid response called for sudden onset syncope. Telemetry did not reveal any arrythmia. Per RN Tori, pt was reported to have been assisted getting out of bed to sit in the chair. Upon sitting, pt immediately slumped over and was unresponsive. After transitioning pt back to bed, BP was found to be 100/60. Upon my examination, pt was responsive to loud verbal stimuli. Started on IVF bolus for 250cc.     Hospital course:  76 y/o female who lives at home and uses walker. She has home health aides and hx of HTN, OA, DM-2. Brought in due to increasing generalized weakness, and fall at home.  Admits to poor appetite. Admits to weight loss but can't quantify amount.  Patient is poor historian and intermittently is oriented to person/place. In ED patient found to have Hbg of 6.5 with no prior for comparison and had black stool on exam which was guaiac positive. EKG with subtle ST depression in lateral leads but neg trop and normal BP. Pt received 3u PRBC then developed pulmonary edema. TTE showed EF 30-35%, severe MR, severe AS. Pt transferred to [facility] for bumex infusion. On 5/3, pt was transitioned to oral bumex.       REVIEW OF SYSTEMS:    LUNGS: No cough  CARDIOVASCULAR: No chest pain       Vital Signs Last 24 Hrs  T(C): 36.6 (03 May 2018 09:42), Max: 37.8 (02 May 2018 15:32)  T(F): 97.9 (03 May 2018 09:42), Max: 100 (02 May 2018 15:32)  HR: 80 (03 May 2018 09:42) (80 - 102)  BP: 130/62 (03 May 2018 09:42) (101/53 - 130/62)  BP(mean): --  RR: 17 (03 May 2018 09:42) (16 - 17)  SpO2: 100% (03 May 2018 05:23) (99% - 100%)    PHYSICAL EXAM:    GENERAL: NAD, frail, elderly, responsive to verbal stimuli, answers appropriately to some questions  HEENT: PERRL, +EOMI, dry MM  CHEST/LUNG: + wheezing  HEART: S1S2+, Regular rate and rhythm   ABDOMEN: Soft, Nontender, Nondistended; Bowel sounds present  EXTREMITIES:  no edema      LABS:                        7.8    12.5  )-----------( 511      ( 03 May 2018 09:23 )             24.4     05-03    133<L>  |  91<L>  |  18.0  ----------------------------<  200<H>  3.8   |  27.0  |  1.52<H>    Ca    8.6      03 May 2018 09:23  Phos  2.3     05-02  Mg     1.3     05-02              MEDICATIONS  (STANDING):  ALBUTerol    0.083% 2.5 milliGRAM(s) Nebulizer every 6 hours  buMETAnide 1 milliGRAM(s) Oral every 12 hours  dextrose 5%. 1000 milliLiter(s) (50 mL/Hr) IV Continuous <Continuous>  dextrose 50% Injectable 12.5 Gram(s) IV Push once  insulin lispro (HumaLOG) corrective regimen sliding scale   SubCutaneous every 6 hours  lisinopril 2.5 milliGRAM(s) Oral daily  metoprolol succinate ER 25 milliGRAM(s) Oral daily  pantoprazole  Injectable 40 milliGRAM(s) IV Push two times a day  piperacillin/tazobactam IVPB. 3.375 Gram(s) IV Intermittent every 8 hours  potassium chloride   Powder 40 milliEquivalent(s) Oral daily  sodium chloride 0.9% lock flush 3 milliLiter(s) IV Push every 8 hours  vancomycin  IVPB 500 milliGRAM(s) IV Intermittent every 12 hours    MEDICATIONS  (PRN):  acetaminophen   Tablet 650 milliGRAM(s) Oral every 6 hours PRN For Temp greater than 38 C (100.4 F)  dextrose Gel 1 Dose(s) Oral once PRN Blood Glucose LESS THAN 70 milliGRAM(s)/deciliter  glucagon  Injectable 1 milliGRAM(s) IntraMuscular once PRN Glucose LESS THAN 70 milligrams/deciliter  ondansetron Injectable 4 milliGRAM(s) IV Push every 6 hours PRN Nausea      RADIOLOGY & ADDITIONAL TESTS:

## 2018-05-03 NOTE — PROGRESS NOTE ADULT - PROBLEM SELECTOR PLAN 1
Likely orthostatic hypotension  Improving with NS bolus  Hold off on diuresis today  Repeat labs pending

## 2018-05-03 NOTE — PROGRESS NOTE ADULT - SUBJECTIVE AND OBJECTIVE BOX
[place] [facility]                                                        Office: [address] [address]                                                       Telephone: [phone]. Fax:[phone]                                                                             PROGRESS NOTE   Reason for follow up: Chf                              subjective:   Patient states she feels well    Complains of:   Review of symptoms:  ROS  Cardiac:  No chest pain. improved dyspnea. No palpitations.  Respiratory:  no cough. No dyspnea  Gastrointestinal: No diarrhea. No abdominal pain. No bleeding.     Past medical history:   Chronic conditions:  Hypertension: controlled. CHF: Stable. CAD: Stable ischemic heart disease. DM: Stable;    Hypertension  Chf  Ashd  Dm	  Vitals:  T(C): 36.6 (05-03-18 @ 05:23), Max: 37.8 (05-02-18 @ 15:32)  HR: 81 (05-03-18 @ 05:23) (81 - 102)  BP: 128/61 (05-03-18 @ 05:23) (95/51 - 128/61)  RR: 16 (05-03-18 @ 05:23) (16 - 17)  SpO2: 100% (05-03-18 @ 05:23) (99% - 100%)  Wt(kg): --  I&O's Summary    02 May 2018 07:01  -  03 May 2018 07:00  --------------------------------------------------------  IN: 555 mL / OUT: 2550 mL / NET: -1995 mL          PHYSICAL EXAM:  Appearance: Comfortable. No acute distress  HEENT:  Head and neck: Atraumatic. Normocephalic.  Normal oral mucosa, PERRL, Neck is supple. No JVD, No carotid bruit.   Neurologic: A & O x 3, no focal deficits. EOMI , Cranial nerves are intact.  Lymphatic: No cervical lymphadenopathy  Cardiovascular: Normal S1 S2, No murmur, rubs/gallops. No JVD, No edema  Respiratory: Lungs clear to auscultation  Gastrointestinal:  Soft, Non-tender, + BS  Lower Extremities: No edema  Psychiatry: Patient is calm. No agitation. Mood & affect appropriate  Skin: No rashes/ ecchymoses/cyanosis/ulcers visualized on the face, hands or feet.    CURRENT MEDICATIONS:  buMETAnide 1 milliGRAM(s) Oral every 12 hours  lisinopril 2.5 milliGRAM(s) Oral daily  metoprolol succinate ER 25 milliGRAM(s) Oral daily    ALBUTerol    0.083%  piperacillin/tazobactam IVPB.  vancomycin  IVPB  pantoprazole  Injectable  dextrose 50% Injectable  insulin lispro (HumaLOG) corrective regimen sliding scale  dextrose 5%.  potassium chloride   Powder  sodium chloride 0.9% lock flush      LABS:	 	                              8.2    12.3  )-----------( 419      ( 02 May 2018 05:46 )             24.1     05-02    134<L>  |  93<L>  |  18.0  ----------------------------<  122<H>  3.5   |  27.0  |  1.33<H>    Ca    8.5<L>      02 May 2018 05:46  Phos  2.3     05-02  Mg     1.3     05-02      proBNP:   Lipid Profile:   HgA1c: Hemoglobin A1C, Whole Blood: 5.0 %  TSH: Thyroid Stimulating Hormone, Serum: 2.53 uIU/mL      TELEMETRY: Reviewed    ECG:  Reviewed by me. 	    DIAGNOSTIC TESTING:  [ ] Echocardiogram:   [ ]  Catheterization:  [ ] Stress Test:    OTHER: [place] [facility]                                                        Office: [address] [address]                                                       Telephone: [phone]. Fax:[phone]                                                                             PROGRESS NOTE  Reason for follow up: Chf                              subjective:   Patient states she feels well  Wants to get oob     Review of symptoms:  ROS  Cardiac:  No chest pain. improved dyspnea. No palpitations.  Respiratory:  no cough. No dyspnea  Gastrointestinal: No diarrhea. No abdominal pain. No bleeding.     Past medical history:   Chronic conditions:  Hypertension: controlled. CHF: Stable. CAD: Stable ischemic heart disease. DM: Stable;    Valvular heart disease  Ef 30% severe calcified AS MR  Ashd  CHF Ef 30%  Hypertension  Dm	    Vitals:  T(C): 36.6 (05-03-18 @ 05:23), Max: 37.8 (05-02-18 @ 15:32)  HR: 81 (05-03-18 @ 05:23) (81 - 102)  BP: 128/61 (05-03-18 @ 05:23) (95/51 - 128/61)  RR: 16 (05-03-18 @ 05:23) (16 - 17)  SpO2: 100% (05-03-18 @ 05:23) (99% - 100%)  Wt(kg): --  I&O's Summary    02 May 2018 07:01  -  03 May 2018 07:00  --------------------------------------------------------  IN: 555 mL / OUT: 2550 mL / NET: -1995 mL    PHYSICAL EXAM:  Appearance: Comfortable. No acute distress  HEENT:  No jvd  Lymphatic: No cervical lymphadenopathy  Cardiovascular: Normal S1 S2, regular 2/6 mack  Respiratory: Decreased breath sounds  Gastrointestinal:  Soft, Non-tender, + BS  Lower Extremities: No edema  Psychiatry: Patient is calm. No agitation. Mood & affect appropriate  Neurologic: A & O x 3, no focal deficits. EOMI , Cranial nerves are intact.      CURRENT MEDICATIONS:  buMETAnide 1 milliGRAM(s) Oral every 12 hours  lisinopril 2.5 milliGRAM(s) Oral daily  metoprolol succinate ER 25 milliGRAM(s) Oral daily  potassium chloride   Powder  ALBUTerol    0.083%  piperacillin/tazobactam IVPB.  vancomycin  IVPB  pantoprazole  Injectable  dextrose 50% Injectable  insulin lispro (HumaLOG) corrective regimen sliding scale  dextrose 5%.        LABS:	 	                       8.2    12.3  )-----------( 419      ( 02 May 2018 05:46 )             24.1     05-02    134<L>  |  93<L>  |  18.0  ----------------------------<  122<H>  3.5   |  27.0  |  1.33<H>    Ca    8.5<L>      02 May 2018 05:46  Phos  2.3     05-02  Mg     1.3     05-02      proBNP:   Lipid Profile:   HgA1c: Hemoglobin A1C, Whole Blood: 5.0 %  TSH: Thyroid Stimulating Hormone, Serum: 2.53 uIU/mL  LABS  pending from today  TELEMETRY: Sinus  No arrhythmias    	  DIAGNOSTIC TESTING:  [ ] Echocardiogram:  1. Technically difficult study.   2. Endocardial visualization was enhanced with intravenous echo contrast.   3. Patient was uncooperative and stopped the test prematurely.   4. Moderately to severely decreased segmental left ventricular systolic   function.   5. Left ventricular ejection fraction, by visual estimation, is 30 to   35%.   6. Severe mitral valve regurgitation.   7. Multiple left ventricular regional wall motion abnormalities exist.   See wall motion findings.   8. Elevated left atrial and left ventricular end-diastolic pressures.   9. There is mild aortic root calcification.  10. The mitral valve leaflets are tethered which is due to reduced   systolic function and elevated LVEDP.  11. Sclerotic aortic valve with decreased opening.  12. Limited information on aortic valve stenosis. Severely calcified   aortic valve. Stroke volume across LVOT is 29 ml. DI= 0.25, Low flow-low   gradient severe aortic valve stenosis vs. Pseudo severe aortic valve   stenosis due to low LVEF. Cardiology consultation is advised.  13. Moderate tricuspid regurgitation.  14. Estimated pulmonary artery systolic pressure is 48.4 mmHg assuming a   right atrial pressure of 3 mmHg, which is consistent with mild pulmonary   hypertension. IVC was not visualized. RAP was estimated at 3 mmHg.  15. There is no evidence of pericardial effusion.  [ ]  Catheterization:  [ ] Stress Test:    OTHER: [place] [facility]                                                        Office: [address] [address]                                                       Telephone: [phone]. Fax:[phone]                                                                             PROGRESS NOTE  Reason for follow up: CHF                              Subjective:   Patient states she feels well  Wants to get oob     Review of symptoms:  ROS  Cardiac:  No chest pain. improved dyspnea. No palpitations.  Respiratory:  no cough. No dyspnea  Gastrointestinal: No diarrhea. No abdominal pain. No bleeding.     Past medical history:   Chronic conditions:  Hypertension: controlled. CHF: Stable. CAD: Stable ischemic heart disease. DM: Stable;    Valvular heart disease  Ef 30% severe calcified AS MR  Ashd  CHF Ef 30%  Hypertension  Dm	    Vitals:  T(C): 36.6 (05-03-18 @ 05:23), Max: 37.8 (05-02-18 @ 15:32)  HR: 81 (05-03-18 @ 05:23) (81 - 102)  BP: 128/61 (05-03-18 @ 05:23) (95/51 - 128/61)  RR: 16 (05-03-18 @ 05:23) (16 - 17)  SpO2: 100% (05-03-18 @ 05:23) (99% - 100%)  Wt(kg): --  I&O's Summary    02 May 2018 07:01  -  03 May 2018 07:00  --------------------------------------------------------  IN: 555 mL / OUT: 2550 mL / NET: -1995 mL    PHYSICAL EXAM:  Appearance: Comfortable. No acute distress  HEENT:  No jvd  Lymphatic: No cervical lymphadenopathy  Cardiovascular: Normal S1 S2, regular 2/6 mack  Respiratory: Decreased breath sounds  Gastrointestinal:  Soft, Non-tender, + BS  Lower Extremities: No edema  Psychiatry: Patient is calm. No agitation. Mood & affect appropriate  Neurologic: A & O x 3, no focal deficits. EOMI , Cranial nerves are intact.      CURRENT MEDICATIONS:  buMETAnide 1 milliGRAM(s) Oral every 12 hours  lisinopril 2.5 milliGRAM(s) Oral daily  metoprolol succinate ER 25 milliGRAM(s) Oral daily  potassium chloride   Powder  ALBUTerol    0.083%  piperacillin/tazobactam IVPB.  vancomycin  IVPB  pantoprazole  Injectable  dextrose 50% Injectable  insulin lispro (HumaLOG) corrective regimen sliding scale  dextrose 5%.        LABS:	 	                       8.2    12.3  )-----------( 419      ( 02 May 2018 05:46 )             24.1     05-02    134<L>  |  93<L>  |  18.0  ----------------------------<  122<H>  3.5   |  27.0  |  1.33<H>    Ca    8.5<L>      02 May 2018 05:46  Phos  2.3     05-02  Mg     1.3     05-02      proBNP:   Lipid Profile:   HgA1c: Hemoglobin A1C, Whole Blood: 5.0 %  TSH: Thyroid Stimulating Hormone, Serum: 2.53 uIU/mL  LABS  pending from today  TELEMETRY: Sinus  No arrhythmias    	  DIAGNOSTIC TESTING:  [ ] Echocardiogram:  1. Technically difficult study.   2. Endocardial visualization was enhanced with intravenous echo contrast.   3. Patient was uncooperative and stopped the test prematurely.   4. Moderately to severely decreased segmental left ventricular systolic   function.   5. Left ventricular ejection fraction, by visual estimation, is 30 to   35%.   6. Severe mitral valve regurgitation.   7. Multiple left ventricular regional wall motion abnormalities exist.   See wall motion findings.   8. Elevated left atrial and left ventricular end-diastolic pressures.   9. There is mild aortic root calcification.  10. The mitral valve leaflets are tethered which is due to reduced   systolic function and elevated LVEDP.  11. Sclerotic aortic valve with decreased opening.  12. Limited information on aortic valve stenosis. Severely calcified   aortic valve. Stroke volume across LVOT is 29 ml. DI= 0.25, Low flow-low   gradient severe aortic valve stenosis vs. Pseudo severe aortic valve   stenosis due to low LVEF. Cardiology consultation is advised.  13. Moderate tricuspid regurgitation.  14. Estimated pulmonary artery systolic pressure is 48.4 mmHg assuming a   right atrial pressure of 3 mmHg, which is consistent with mild pulmonary   hypertension. IVC was not visualized. RAP was estimated at 3 mmHg.  15. There is no evidence of pericardial effusion.  [ ]  Catheterization:  [ ] Stress Test:    OTHER: [place] [facility]                                                        Office: [address] [address]                                                       Telephone: [phone]. Fax:[phone]                                                                             PROGRESS NOTE  Reason for follow up: CHF                              Subjective:   Patient states she feels well  Wants to get oob     Review of symptoms:  ROS  Cardiac:  No chest pain. improved dyspnea. No palpitations.  Respiratory:  no cough. No dyspnea  Gastrointestinal: No diarrhea. No abdominal pain. No bleeding.     Past medical history:   Chronic conditions:  Hypertension: controlled. CHF: Stable. CAD: Stable ischemic heart disease. DM: Stable;    Valvular heart disease  Ef 30% severe calcified AS MR  Ashd  CHF Ef 30%  Hypertension  Dm	    Vitals:  T(C): 36.6 (05-03-18 @ 05:23), Max: 37.8 (05-02-18 @ 15:32)  HR: 81 (05-03-18 @ 05:23) (81 - 102)  BP: 128/61 (05-03-18 @ 05:23) (95/51 - 128/61)  RR: 16 (05-03-18 @ 05:23) (16 - 17)  SpO2: 100% (05-03-18 @ 05:23) (99% - 100%)  Wt(kg): --  I&O's Summary    02 May 2018 07:01  -  03 May 2018 07:00  --------------------------------------------------------  IN: 555 mL / OUT: 2550 mL / NET: -1995 mL    PHYSICAL EXAM:  Appearance: Comfortable. No acute distress  HEENT:  No jvd  Lymphatic: No cervical lymphadenopathy  Cardiovascular: Normal S1 S2, regular 2/6 mack  Respiratory: Decreased breath sounds  Gastrointestinal:  Soft, Non-tender, + BS  Lower Extremities: No edema  Psychiatry: Patient is calm. No agitation. Mood & affect appropriate  Neurologic: A & O x 3, no focal deficits. EOMI , Cranial nerves are intact.      CURRENT MEDICATIONS:  buMETAnide 1 milliGRAM(s) Oral every 12 hours  lisinopril 2.5 milliGRAM(s) Oral daily  metoprolol succinate ER 25 milliGRAM(s) Oral daily  potassium chloride   Powder  ALBUTerol    0.083%  piperacillin/tazobactam IVPB.  vancomycin  IVPB  pantoprazole  Injectable  dextrose 50% Injectable  insulin lispro (HumaLOG) corrective regimen sliding scale  dextrose 5%.    LABS: 	                       8.2    12.3  )-----------( 419      ( 02 May 2018 05:46 )             24.1     05-02    134<L>  |  93<L>  |  18.0  ----------------------------<  122<H>  3.5   |  27.0  |  1.33<H>    Ca    8.5<L>      02 May 2018 05:46  Phos  2.3     05-02  Mg     1.3     05-02      proBNP:   Lipid Profile:   HgA1c: Hemoglobin A1C, Whole Blood: 5.0 %  TSH: Thyroid Stimulating Hormone, Serum: 2.53 uIU/mL  LABS  pending from today  TELEMETRY: Sinus  No arrhythmias    	  DIAGNOSTIC TESTING:  [ ] Echocardiogram:  1. Technically difficult study.   2. Endocardial visualization was enhanced with intravenous echo contrast.   3. Patient was uncooperative and stopped the test prematurely.   4. Moderately to severely decreased segmental left ventricular systolic   function.   5. Left ventricular ejection fraction, by visual estimation, is 30 to   35%.   6. Severe mitral valve regurgitation.   7. Multiple left ventricular regional wall motion abnormalities exist.   See wall motion findings.   8. Elevated left atrial and left ventricular end-diastolic pressures.   9. There is mild aortic root calcification.  10. The mitral valve leaflets are tethered which is due to reduced   systolic function and elevated LVEDP.  11. Sclerotic aortic valve with decreased opening.  12. Limited information on aortic valve stenosis. Severely calcified   aortic valve. Stroke volume across LVOT is 29 ml. DI= 0.25, Low flow-low   gradient severe aortic valve stenosis vs. Pseudo severe aortic valve   stenosis due to low LVEF. Cardiology consultation is advised.  13. Moderate tricuspid regurgitation.  14. Estimated pulmonary artery systolic pressure is 48.4 mmHg assuming a   right atrial pressure of 3 mmHg, which is consistent with mild pulmonary   hypertension. IVC was not visualized. RAP was estimated at 3 mmHg.  15. There is no evidence of pericardial effusion.  [ ]  Catheterization:  [ ] Stress Test:    OTHER:

## 2018-05-03 NOTE — CHART NOTE - NSCHARTNOTEFT_GEN_A_CORE
Rapid Response PGY 2/ PGY 3 Note  Patient is a 77y old  Female admitted for acute systolic CHF, GI bleed, and valvular dysfunction.    Rapid response team called because of decreased responsiveness and lethargy.  Patient had a recent dark bowel movement.  Was arousable to verbal stimuli.  No acute complaints of chest pain, SOB, abdominal pain, or fevers.  PMD Dr Cristina notified and present at bedside.  PMD took over lead of rapid response.    Allergies    No Known Allergies    Intolerances        PAST MEDICAL & SURGICAL HISTORY:  Other type of osteoarthritis  Type 2 diabetes mellitus with complication, without long-term current use of insulin  Essential hypertension  No significant past surgical history      Vital Signs at Rapid Response  /60  Temp 97.9  HR 92  RR 32  97% on 2L NC      GENERAL: The patient is lethargic but arousable to verbal and physical stimuli  HEENT: Head is normocephalic and atraumatic.   NECK: Supple.  LUNGS: + wheezing bilaterally respirations unlabored  HEART: Regular rate and rhythm ,+S1/+S2, no murmurs  ABDOMEN: Soft, mild diffuse tenderness, and nondistended, no rebound, guarding rigidity, bowel sounds in all 4 quadrants  EXTREMITIES: Without any cyanosis, edema.  SKIN: dry mucus membranes.  cap refill>2 sec  VASCULAR: Radial and Dorsal pedal pulses palpable BL  NEUROLOGIC: AAOx1, moves all extremities purposefully.    05-02 @ 07:01  -  05-03 @ 07:00  --------------------------------------------------------  IN: 555 mL / OUT: 2550 mL / NET: -1995 mL                              7.8    12.5  )-----------( 511      ( 03 May 2018 09:23 )             24.4     05-03    133<L>  |  91<L>  |  18.0  ----------------------------<  200<H>  3.8   |  27.0  |  1.52<H>    Ca    8.6      03 May 2018 09:23  Phos  2.3     05-02  Mg     1.3     05-02        Assessment- Rapid Response called for 77y year old Female with a past medical history of sCHF with EF 30-35%, aortic stenosis currently admitted for acute sCHF and GI Bleed.  Currently on vancomycin/zosyn for treatment of PNA.   Patient appears to be volume depleted due to aggressive diuresis.  Lost almost 2L of fluids over last 24hrs.  Symptoms likely due to orthostatics.    Plan discussed with Dr Cristina at bedside who wants the following interventions.  -250cc bolus trial of NS. repeat vitals: 94/50 HR92  RR32  -EKG: showed nonspecific T wave changes. no event noted on monitor  -CBC, BMP, Mag, Phos, type+screen, Troponin X2  -Cardiology to be called by cardio NP also at bedside.  -SROC Dr Chacon at bedside who agrees.  -Debrief performed with nursing staff at bedside.  -Will follow up in 1-2hrs.    [place] PGY2 Rapid Response PGY 2/ PGY 3 Note  Patient is a 77y old  Female admitted for acute systolic CHF, GI bleed, and valvular dysfunction.    Rapid response team called because of decreased responsiveness and lethargy.  Patient had a recent dark bowel movement.  Was arousable to verbal stimuli.  No acute complaints of chest pain, SOB, abdominal pain, or fevers.  PMD Dr Cristina notified and present at bedside.  PMD took over lead of rapid response.    Allergies    No Known Allergies    Intolerances        PAST MEDICAL & SURGICAL HISTORY:  Other type of osteoarthritis  Type 2 diabetes mellitus with complication, without long-term current use of insulin  Essential hypertension  No significant past surgical history      Vital Signs at Rapid Response  /60  Temp 97.9  HR 92  RR 32  97% on 2L NC      GENERAL: The patient is lethargic but arousable to verbal and physical stimuli  HEENT: Head is normocephalic and atraumatic.   NECK: Supple.  LUNGS: + wheezing bilaterally respirations unlabored  HEART: Regular rate and rhythm ,+S1/+S2, no murmurs  ABDOMEN: Soft, mild diffuse tenderness, and nondistended, no rebound, guarding rigidity, bowel sounds in all 4 quadrants  EXTREMITIES: Without any cyanosis, edema.  SKIN: dry mucus membranes.  cap refill>2 sec  VASCULAR: Radial and Dorsal pedal pulses palpable BL  NEUROLOGIC: AAOx1, moves all extremities purposefully.    05-02 @ 07:01  -  05-03 @ 07:00  --------------------------------------------------------  IN: 555 mL / OUT: 2550 mL / NET: -1995 mL                              7.8    12.5  )-----------( 511      ( 03 May 2018 09:23 )             24.4     05-03    133<L>  |  91<L>  |  18.0  ----------------------------<  200<H>  3.8   |  27.0  |  1.52<H>    Ca    8.6      03 May 2018 09:23  Phos  2.3     05-02  Mg     1.3     05-02        Assessment- Rapid Response called for 77y year old Female with a past medical history of sCHF with EF 30-35%, aortic stenosis currently admitted for acute sCHF and GI Bleed.  Currently on vancomycin/zosyn for treatment of PNA.   Patient appears to be volume depleted due to aggressive diuresis.  Lost almost 2L of fluids over last 24hrs.  Symptoms likely due to orthostatics.      Plan discussed with Dr Cristina at bedside who wants the following interventions.  -250cc bolus trial of NS. repeat vitals: 94/50 HR92  RR32  -EKG: showed nonspecific T wave changes. no event noted on monitor  -CBC, BMP, Mag, Phos, type+screen, Troponin X2  -Cardiology to be called by cardio NP also at bedside.  -SROC Dr Chacon at bedside who agrees.  -Debrief performed with nursing staff at bedside.  -Will follow up in 1-2hrs.    [place] PGY2

## 2018-05-03 NOTE — PROGRESS NOTE ADULT - ASSESSMENT
76 y/o female with hx of hypertension, Dm, Ashd, systolic CHF, MR aortic stenosis presents with weakness developed melana and required 2 units prbc, currently being treated for Chf and Pnemonia

## 2018-05-03 NOTE — CHART NOTE - NSCHARTNOTEFT_GEN_A_CORE
Rapid Response Follow-up  Patient seen and evaluated at bedside. Patient received IVF bolus of 250 cc followed by 150cc bolus since last assessment.  Patient continues to be lethargic, with increased arousability compared to previous assessment. Physical Exam otherwise unchanged. Patient labs revealing drop in H/H with 1unit of pRBCs to be given to patient. Labs also significant for decreased phosphorous and magnesium which are being replaced as well as an elevated troponin. Cardiology NP aware of troponin level, states she discussed patient with Cardiologist. Patient to be given 1 unit of blood and may be a candidate for cardiac catheterization. However, there are concerns regarding procedure as patient may not be a candidate for antiplatelet therapy given her recent GI bleed. Overall patient with improving mental status although not at baseline. Patient to be treated for anemia and electrolyte abnormalities. Management as per primary team.

## 2018-05-03 NOTE — PROGRESS NOTE ADULT - SUBJECTIVE AND OBJECTIVE BOX
Cardiology PA note    Called by nurse lab reported elevated tropin. Patient is in bed denies CP, SOB, N/V/d, admits to weakness, being transfused secondary to anemia likely secondary to GI bleed. EKG done and C/W piror EKG, ST depression in V4 thru V6, with non-specific T Wave changes.     CARDIAC MARKERS ( 03 May 2018 20:21 )   U/L / CKMBx     / Troponin T2.68 ng/mL /  CARDIAC MARKERS ( 03 May 2018 11:05 )  CKx     / CKMBx     / Troponin T2.49 ng/mL /    I D/W patient with Dr Smith and Dr Miller both agree transfuse one unit PRBC tonight and c/w trending CE and EKG.  may use Lasix post transfusion if any S/S of PVC.

## 2018-05-04 NOTE — PROGRESS NOTE ADULT - SUBJECTIVE AND OBJECTIVE BOX
MELIDA SNOWDEN    [id number]    77y      Female    INTERVAL HPI/OVERNIGHT EVENTS: Troponin continued to trend up. Pt more awake today and is confused. However, she denies any chest pain or abdominal pain.    Hospital course:  78 y/o female who lives at home and uses walker. She has home health aides and hx of HTN, OA, DM-2. Brought in due to increasing generalized weakness, and fall at home.  Admits to poor appetite. Admits to weight loss but can't quantify amount.  Patient is poor historian and intermittently is oriented to person/place. In ED patient found to have Hbg of 6.5 with no prior for comparison and had black stool on exam which was guaiac positive. EKG with subtle ST depression in lateral leads but neg trop and normal BP. Pt received 3u PRBC then developed pulmonary edema. TTE showed EF 30-35%, severe MR, severe AS. Pt transferred to [facility] for bumex infusion. On 5/3, pt was transitioned to oral bumex. On 5/3, rapid response called for sudden onset syncope s/p melena. Repeat hgb 7.3 and received 2u PRBC with appropriate response. Troponin noted to be elevated at 2.49.     REVIEW OF SYSTEMS:    CONSTITUTIONAL: No fever  CARDIOVASCULAR: No chest pain     Vital Signs Last 24 Hrs  T(C): 37 (04 May 2018 05:13), Max: 37.3 (03 May 2018 20:59)  T(F): 98.6 (04 May 2018 05:13), Max: 99.2 (03 May 2018 20:59)  HR: 95 (04 May 2018 05:13) (88 - 101)  BP: 125/45 (04 May 2018 05:13) (92/50 - 129/42)  BP(mean): --  RR: 16 (04 May 2018 05:13) (16 - 24)  SpO2: 98% (04 May 2018 05:13) (96% - 100%)    PHYSICAL EXAM:    GENERAL: NAD, easily arousable to verbal stimuli  HEENT: PERRL, +EOMI, MMM  CHEST/LUNG: Clear to percussion bilaterally   HEART: S1S2+   ABDOMEN: Soft, Nontender, Nondistended; Bowel sounds present  EXTREMITIES:  no edema    LABS:                        10.1   17.7  )-----------( 472      ( 04 May 2018 04:06 )             30.5     05-04    137  |  95<L>  |  42.0<H>  ----------------------------<  148<H>  3.9   |  27.0  |  1.70<H>    Ca    8.7      04 May 2018 04:06  Phos  2.0     05-03  Mg     2.0     05-04              MEDICATIONS  (STANDING):  ALBUTerol    0.083% 2.5 milliGRAM(s) Nebulizer every 6 hours  buMETAnide 0.5 milliGRAM(s) Oral every 12 hours  dextrose 5%. 1000 milliLiter(s) (50 mL/Hr) IV Continuous <Continuous>  dextrose 50% Injectable 12.5 Gram(s) IV Push once  insulin lispro (HumaLOG) corrective regimen sliding scale   SubCutaneous every 6 hours  metoprolol succinate ER 25 milliGRAM(s) Oral daily  pantoprazole  Injectable 40 milliGRAM(s) IV Push two times a day  piperacillin/tazobactam IVPB. 3.375 Gram(s) IV Intermittent every 8 hours  potassium chloride   Powder 40 milliEquivalent(s) Oral daily  sodium chloride 0.9% lock flush 3 milliLiter(s) IV Push every 8 hours    MEDICATIONS  (PRN):  acetaminophen   Tablet 650 milliGRAM(s) Oral every 6 hours PRN For Temp greater than 38 C (100.4 F)  dextrose Gel 1 Dose(s) Oral once PRN Blood Glucose LESS THAN 70 milliGRAM(s)/deciliter  glucagon  Injectable 1 milliGRAM(s) IntraMuscular once PRN Glucose LESS THAN 70 milligrams/deciliter  ondansetron Injectable 4 milliGRAM(s) IV Push every 6 hours PRN Nausea      RADIOLOGY & ADDITIONAL TESTS:

## 2018-05-04 NOTE — PROGRESS NOTE ADULT - ASSESSMENT
1. Spoke with Pt and Proxy. Advised cardiac cath. They agree  2. If no significant CAD, can proceed with GI workup  3. Will need JEFF for MR next week  4. DC diuretic today with increasing Cr,

## 2018-05-04 NOTE — PROGRESS NOTE ADULT - SUBJECTIVE AND OBJECTIVE BOX
Nurse Practitioner Progress note:   s/p LHC which revealed severe 2VD, moderate AS and severe MR  Denies chest pain, shortness of breath, dizziness or palpitations at this time      left radial hemoband in place.  Procedure site CDI, no bleeding, no hematoma, able to move all digits with capillary refill < 3 seconds, fingers warm      T(C): 36.7 (05-04-18 @ 17:15), Max: 38.3 (05-04-18 @ 16:34)  HR: 83 (05-04-18 @ 17:15) (83 - 101)  BP: 122/55 (05-04-18 @ 17:15) (98/56 - 129/42)  RR: 20 (05-04-18 @ 17:15) (16 - 20)  SpO2: 99% (05-04-18 @ 17:15) (96% - 100%)    CBC Full  -  ( 04 May 2018 04:06 )  WBC Count : 17.7 K/uL  Hemoglobin : 10.1 g/dL  Hematocrit : 30.5 %  Platelet Count - Automated : 472 K/uL  Mean Cell Volume : 85.9 fl  Mean Cell Hemoglobin : 28.5 pg  Mean Cell Hemoglobin Concentration : 33.1 g/dL    05-04    137  |  95<L>  |  42.0<H>  ----------------------------<  148<H>  3.9   |  27.0  |  1.70<H>    Ca    8.7      04 May 2018 04:06  Phos  2.0     05-03  Mg     2.0     05-04      CARDIAC MARKERS ( 04 May 2018 04:06 )  x     / 2.80 ng/mL / x     / x     / x      CARDIAC MARKERS ( 03 May 2018 20:21 )  x     / 2.68 ng/mL / 103 U/L / x     / x      CARDIAC MARKERS ( 03 May 2018 11:05 )  x     / 2.49 ng/mL / x     / x     / x          MEDICATIONS  (STANDING):  ALBUTerol    0.083% 2.5 milliGRAM(s) Nebulizer every 6 hours  dextrose 5%. 1000 milliLiter(s) (50 mL/Hr) IV Continuous <Continuous>  dextrose 50% Injectable 12.5 Gram(s) IV Push once  insulin lispro (HumaLOG) corrective regimen sliding scale   SubCutaneous every 6 hours  metoprolol succinate ER 25 milliGRAM(s) Oral daily  pantoprazole  Injectable 40 milliGRAM(s) IV Push two times a day  piperacillin/tazobactam IVPB. 3.375 Gram(s) IV Intermittent every 8 hours  potassium chloride   Powder 40 milliEquivalent(s) Oral daily  sodium chloride 0.9% lock flush 3 milliLiter(s) IV Push every 8 hours    MEDICATIONS  (PRN):  acetaminophen   Tablet 650 milliGRAM(s) Oral every 6 hours PRN For Temp greater than 38 C (100.4 F)  dextrose Gel 1 Dose(s) Oral once PRN Blood Glucose LESS THAN 70 milliGRAM(s)/deciliter  glucagon  Injectable 1 milliGRAM(s) IntraMuscular once PRN Glucose LESS THAN 70 milligrams/deciliter  ondansetron Injectable 4 milliGRAM(s) IV Push every 6 hours PRN Nausea        HPI:  78 y/o female who lives at home and uses walker. She has home health aides and hx of HTN, OA, DM-2. Brought in due to increasing generalized weakness, and fall at home. No c/o focal weakness, CP, SOB, N/V, SOB. Admits to poor appetite. Admits to weight loss but cant quantify amount. Denies any hx of malignancy. Patient is poor historian and intermittently is oriented to person/place. In ED patient found to have Hbg of 6.5 with no prior for comparison and had black stool on exam which was guaiac positive. EKG with subtle ST depression in lateral leads but neg trop and normal BP. Denies any hx of GIB or ever needing a transfusion. Never had EGD/Colonoscopy before. Patient also with evidence of dehydration, hyponatremia, malnutrition. Will be admitted for further w/u and transfusion. (28 Apr 2018 01:55)    now s/p [facility] which revealed severe 2VD, moderate AS and severe MR    ASSESSMENT/PLAN:    Severe 2V Coronary artery disease  -medical management   -Return to 4 tower   -VS, labs, diet, activity as per PCI orders  -IV hydration  -Encourage PO fluids  -Resume medical management as per hospitalist team

## 2018-05-04 NOTE — PROGRESS NOTE ADULT - SUBJECTIVE AND OBJECTIVE BOX
CHIEF COMPLAINT:Patient is a 77y old  Female who presents with a chief complaint of weakness, fall (28 Apr 2018 01:55)  pt seen with HFrEF, MR, PNA  Proxy at bedside.   NSTEMI with supply-demand mismatch   No cp. Comfortable today.     Allergies  No Known Allergies    	  MEDICATIONS:  metoprolol succinate ER 25 milliGRAM(s) Oral daily  piperacillin/tazobactam IVPB. 3.375 Gram(s) IV Intermittent every 8 hours  ALBUTerol    0.083% 2.5 milliGRAM(s) Nebulizer every 6 hours  acetaminophen   Tablet 650 milliGRAM(s) Oral every 6 hours PRN  ondansetron Injectable 4 milliGRAM(s) IV Push every 6 hours PRN  pantoprazole  Injectable 40 milliGRAM(s) IV Push two times a day  dextrose 50% Injectable 12.5 Gram(s) IV Push once  dextrose Gel 1 Dose(s) Oral once PRN  glucagon  Injectable 1 milliGRAM(s) IntraMuscular once PRN  insulin lispro (HumaLOG) corrective regimen sliding scale   SubCutaneous every 6 hours  dextrose 5%. 1000 milliLiter(s) IV Continuous <Continuous>  potassium chloride   Powder 40 milliEquivalent(s) Oral daily  sodium chloride 0.9% lock flush 3 milliLiter(s) IV Push every 8 hours    PHYSICAL EXAM:  T(C): 36.6 (05-04-18 @ 12:02), Max: 37.3 (05-03-18 @ 20:59)  HR: 89 (05-04-18 @ 12:02) (88 - 101)  BP: 128/50 (05-04-18 @ 12:02) (92/50 - 129/42)  RR: 16 (05-04-18 @ 12:02) (16 - 24)  SpO2: 100% (05-04-18 @ 12:02) (96% - 100%)    HEENT:   NC/AT  Eye: Pink Conjunctiva  Lungs: Decrease air entry at bases.   CVS: RRR, Normal S1 and S2, No Edema  Pulses: Normal distal pulses.        LABS:	 	                        10.1   17.7  )-----------( 472      ( 04 May 2018 04:06 )             30.5     05-04    137  |  95<L>  |  42.0<H>  ----------------------------<  148<H>  3.9   |  27.0  |  1.70<H>    Ca    8.7      04 May 2018 04:06  Phos  2.0     05-03  Mg     2.0     05-04      proBNP:   Lipid Profile:   HgA1c:   TSH:     ASSESSMENT/PLAN:

## 2018-05-04 NOTE — PROGRESS NOTE ADULT - SUBJECTIVE AND OBJECTIVE BOX
Cardiology PA note     Tropin trending up 2.80, 2.68, 2.49, patient remains CP free. D/W Dr Miller and will sign out this AM to Cardiology, need for cardiac cath in setting of GI bleed.

## 2018-05-05 NOTE — CONSULT NOTE ADULT - SUBJECTIVE AND OBJECTIVE BOX
Patient is a 77y old  Female who presents with a chief complaint of weakness, fall (28 Apr 2018 01:55)    PAST MEDICAL & SURGICAL HISTORY:  Other type of osteoarthritis  Type 2 diabetes mellitus with complication, without long-term current use of insulin  Essential hypertension  No significant past surgical history    MELIDA SNOWDEN   77y    Female        ICU Vital Signs Last 24 Hrs  T(C): 37.1 (05 May 2018 19:21), Max: 37.1 (05 May 2018 19:21)  T(F): 98.8 (05 May 2018 19:21), Max: 98.8 (05 May 2018 19:21)  HR: 118 (05 May 2018 20:40) (76 - 118)  BP: 125/59 (05 May 2018 20:34) (70/40 - 128/81)  BP(mean): 78 (05 May 2018 20:34) (57 - 78)  ABP: --  ABP(mean): --  RR: 29 (05 May 2018 20:34) (15 - 30)  SpO2: 100% (05 May 2018 15:55) (100% - 100%)    Physical Examination:    General:  sedate     HEENT: RIN PREET    PULM: bilateral BS     CVS: s1 s2 reg    ABD: distended soft + BS     EXT:  no edema     SKIN: Warm    Neuro: sedate moves 4     ABG - ( 05 May 2018 21:22 )  pH, Arterial: 7.24  pH, Blood: x     /  pCO2: 46    /  pO2: 147   / HCO3: 18    / Base Excess: -7.4  /  SaO2: 99          Mode: AC/ CMV (Assist Control/ Continuous Mandatory Ventilation)  RR (machine): 12  TV (machine): 450  FiO2: 40  PEEP: 5  MAP: 12  PIP: 32    Mode: AC/ CMV (Assist Control/ Continuous Mandatory Ventilation), RR (machine): 12, TV (machine): 450, FiO2: 40, PEEP: 5, MAP: 12, PIP: 32  LABS:                        6.7    16.2  )-----------( 512      ( 05 May 2018 19:55 )             21.0     05-05    133<L>  |  94<L>  |  39.0<H>  ----------------------------<  196<H>  4.8   |  27.0  |  1.54<H>    Ca    8.1<L>      05 May 2018 19:55  Mg     1.7     05-05    TPro  4.6<L>  /  Alb  2.2<L>  /  TBili  0.4  /  DBili  x   /  AST  44<H>  /  ALT  17  /  AlkPhos  45  05-05      CARDIAC MARKERS ( 04 May 2018 04:06 )  x     / 2.80 ng/mL / x     / x     / x          CAPILLARY BLOOD GLUCOSE      POCT Blood Glucose.: 208 mg/dL (05 May 2018 19:32)      PT/INR - ( 05 May 2018 19:55 )   PT: 13.2 sec;   INR: 1.20 ratio         PTT - ( 05 May 2018 19:55 )  PTT:24.7 sec    CULTURES:  Rapid RVP Result: NotDetec (05-01 @ 18:41)  Culture Results:   No growth at 5 days. (04-30 @ 16:42)  Culture Results:   No growth at 5 days. (04-30 @ 16:42)      Medications:  piperacillin/tazobactam IVPB. 3.375 Gram(s) IV Intermittent every 8 hours  vancomycin  IVPB 750 milliGRAM(s) IV Intermittent every 24 hours  norepinephrine Infusion 0.01 MICROgram(s)/kG/Min IV Continuous <Continuous>  ALBUTerol    0.083% 2.5 milliGRAM(s) Nebulizer every 6 hours  acetaminophen   Tablet 650 milliGRAM(s) Oral every 6 hours PRN  fentaNYL    Injectable 50 MICROGram(s) IV Push every 2 hours PRN  LORazepam   Injectable 1 milliGRAM(s) IV Push every 1 hour PRN  LORazepam   Injectable 2 milliGRAM(s) IntraMuscular once  metoclopramide Injectable 10 milliGRAM(s) IV Push every 6 hours  ondansetron Injectable 4 milliGRAM(s) IV Push every 6 hours PRN  pantoprazole Infusion 8 mG/Hr IV Continuous <Continuous>  dextrose 50% Injectable 12.5 Gram(s) IV Push once  dextrose Gel 1 Dose(s) Oral once PRN  glucagon  Injectable 1 milliGRAM(s) IntraMuscular once PRN  insulin lispro (HumaLOG) corrective regimen sliding scale   SubCutaneous every 6 hours  calcium gluconate IVPB 2 Gram(s) IV Intermittent once  dextrose 5%. 1000 milliLiter(s) IV Continuous <Continuous>  potassium chloride   Powder 40 milliEquivalent(s) Oral daily  sodium chloride 0.9% Bolus 1000 milliLiter(s) IV Bolus once  sodium chloride 0.9% Bolus 1000 milliLiter(s) IV Bolus once  sodium chloride 0.9% lock flush 3 milliLiter(s) IV Push every 8 hours      05-04 @ 07:01  -  05-05 @ 07:00  --------------------------------------------------------  IN: 545 mL / OUT: 0 mL / NET: 545 mL        RADIOLOGY/IMAGING/ECHO  < from: TTE Echo Complete w/Doppler (04.30.18 @ 15:13) >   1. Technically difficult study.   2. Endocardial visualization was enhanced with intravenous echo contrast.   3. Patient was uncooperative and stopped the test prematurely.   4. Moderately to severely decreased segmental left ventricular systolic   function.   5. Left ventricular ejection fraction, by visual estimation, is 30 to   35%.   6. Severe mitral valve regurgitation.   7. Multiple left ventricular regional wall motion abnormalities exist.   See wall motion findings.   8. Elevated left atrial and left ventricular end-diastolic pressures.   9. There is mild aortic root calcification.  10. The mitral valve leaflets are tethered which is due to reduced   systolic function and elevated LVEDP.  11. Sclerotic aortic valve with decreased opening.  12. Limited information on aortic valve stenosis. Severely calcified   aortic valve. Stroke volume across LVOT is 29 ml. DI= 0.25, Low flow-low   gradient severe aortic valve stenosis vs. Pseudo severe aortic valve   stenosis due to low LVEF. Cardiology consultation is advised.  13. Moderate tricuspid regurgitation.  14. Estimated pulmonary artery systolic pressure is 48.4 mmHg assuming a   right atrial pressure of 3 mmHg, which is consistent with mild pulmonary   hypertension. IVC was not visualized. RAP was estimated at 3 mmHg.  15. There is no evidence of pericardial effusion.  16. Finding were D/W Dr. Ashley Cristina.      < from: Cardiac Cath Lab - Adult (05.04.18 @ 18:36) >  DIAGNOSTIC IMPRESSIONS: There is significant double vessel coronary artery  disease.  Mid LAD=95%  Prox Ramus=90%  Mid RCA=99%  Mid HUW=404 (Fills via collaterals from the LAD  Moderate Aortic Stenosis(AVG=19-25 mmHg)  DIAGNOSTIC RECOMMENDATIONS: Patient management should include aggressive  medical therapy, close monitoring of BUN and creatinine, and a cardiac  rehabilitation program. Medical management is recommended.          Critical care point of care ultrasound:   IVC collapsed    Assessment/Plan:    77F hx HTN DM OA admit with FTT/CHF/PNA/NSTEMI/GIB.   Bled a few days ago required transfusion.  EGD not done do to MI and VHD    Cath today sever 2V CAD  Severe MR, Mod AS.    Rapid response for massive UBIB ABLA hemorrhagic shock.   Quick resus with fluids and blood products.  Required intubation for hypoxemia and push dose pressor during initial resus.   OGT placed 500 cc Bright blood.  ABG shows improved hgb but Met/lacid acidodsis.  Dr Paredes at bedside       PPI drip  Reglan to empty stomach   Endoscopy in AM with Dr Paredes    Case d/w Dr Owens E-ICU.   HCP Blayne at bedside and updated.    Continue ABX for now was being treated for "PNA"  DVT boots       Minutes of Critical Care time: 110  (Reviewing data, imaging, discussing with multidisciplinary team, non inclusive of procedures, discussing goals of care with patient/family)

## 2018-05-05 NOTE — PROGRESS NOTE ADULT - PROBLEM SELECTOR PLAN 1
recurrant and probably due to NSAID induced PUD-will plan for EGD in AM-keep hemoglobin over 8 and moniter H/H frequesntly. Suggest constant infusion pantoprazole and [id number] IV q6h to help empty stomach of blood.

## 2018-05-05 NOTE — PROGRESS NOTE ADULT - PROBLEM SELECTOR PROBLEM 6
Anemia due to blood loss
Gastrointestinal hemorrhage, unspecified gastrointestinal hemorrhage type
Valvular disease
Valvular disease
Gastrointestinal hemorrhage, unspecified gastrointestinal hemorrhage type

## 2018-05-05 NOTE — PROGRESS NOTE ADULT - SUBJECTIVE AND OBJECTIVE BOX
MELIDA SNOWDEN    [id number]    77y      Female    INTERVAL HPI/OVERNIGHT EVENTS: Offers no complaints. Much more awake and alert but still confused.    HOspital course:  76 y/o female who lives at home and uses walker. She has home health aides and hx of HTN, OA, DM-2. Brought in due to increasing generalized weakness, and fall at home.  Admits to poor appetite. Admits to weight loss but can't quantify amount.  Patient is poor historian and intermittently is oriented to person/place. In ED patient found to have Hbg of 6.5 with no prior for comparison and had black stool on exam which was guaiac positive. EKG with subtle ST depression in lateral leads but neg trop and normal BP. Pt received 3u PRBC then developed pulmonary edema. TTE showed EF 30-35%, severe MR, severe AS. Pt transferred to [facility] for bumex infusion. On 5/3, pt was transitioned to oral bumex. On 5/3, rapid response called for sudden onset syncope s/p melena. Repeat hgb 7.3 and received 2u PRBC with appropriate response. Troponin noted to be elevated at 2.49. On 5/4, pt had cardiac cath revealing 2VD Mid LAD=95%, Prox Ramus=90%, Mid RCA=99%, Mid LIE=632    REVIEW OF SYSTEMS:    CONSTITUTIONAL: No fever   RESPIRATORY: No cough   CARDIOVASCULAR: No chest pain     Vital Signs Last 24 Hrs  T(C): 36.9 (05 May 2018 09:56), Max: 38.3 (04 May 2018 16:34)  T(F): 98.4 (05 May 2018 09:56), Max: 100.9 (04 May 2018 16:34)  HR: 77 (05 May 2018 09:56) (76 - 89)  BP: 114/38 (05 May 2018 09:56) (98/56 - 135/66)  BP(mean): 57 (05 May 2018 09:56) (57 - 57)  RR: 15 (05 May 2018 09:56) (15 - 20)  SpO2: 100% (05 May 2018 09:56) (98% - 100%)    PHYSICAL EXAM:    GENERAL: NAD   HEENT: PERRL, +EOMI, MMM  CHEST/LUNG: Clear to percussion bilaterally; No wheezing  HEART: S1S2+, Regular rate and rhythm, +systolic murmur  ABDOMEN: Soft, Nontender, Nondistended; Bowel sounds present  EXTREMITIES: no edema    LABS:                        9.0    13.7  )-----------( 472      ( 05 May 2018 06:39 )             27.6     05-05    135  |  95<L>  |  36.0<H>  ----------------------------<  120<H>  3.7   |  29.0  |  1.64<H>    Ca    8.2<L>      05 May 2018 06:40  Phos  2.0     05-03  Mg     1.7     05-05              MEDICATIONS  (STANDING):  ALBUTerol    0.083% 2.5 milliGRAM(s) Nebulizer every 6 hours  dextrose 5%. 1000 milliLiter(s) (50 mL/Hr) IV Continuous <Continuous>  dextrose 50% Injectable 12.5 Gram(s) IV Push once  insulin lispro (HumaLOG) corrective regimen sliding scale   SubCutaneous every 6 hours  metoprolol succinate ER 25 milliGRAM(s) Oral daily  pantoprazole  Injectable 40 milliGRAM(s) IV Push two times a day  piperacillin/tazobactam IVPB. 3.375 Gram(s) IV Intermittent every 8 hours  potassium chloride   Powder 40 milliEquivalent(s) Oral daily  sodium chloride 0.9% lock flush 3 milliLiter(s) IV Push every 8 hours  vancomycin  IVPB 750 milliGRAM(s) IV Intermittent every 24 hours    MEDICATIONS  (PRN):  acetaminophen   Tablet 650 milliGRAM(s) Oral every 6 hours PRN For Temp greater than 38 C (100.4 F)  dextrose Gel 1 Dose(s) Oral once PRN Blood Glucose LESS THAN 70 milliGRAM(s)/deciliter  glucagon  Injectable 1 milliGRAM(s) IntraMuscular once PRN Glucose LESS THAN 70 milligrams/deciliter  ondansetron Injectable 4 milliGRAM(s) IV Push every 6 hours PRN Nausea      RADIOLOGY & ADDITIONAL TESTS:

## 2018-05-05 NOTE — PROCEDURE NOTE - NSINDICATIONS_GEN_A_CORE
airway protection/respiratory distress
critical illness/emergency venous access/volume resuscitation

## 2018-05-05 NOTE — PROGRESS NOTE ADULT - SUBJECTIVE AND OBJECTIVE BOX
Pt seen and examined f/u recurrant UGI bleed  This evening rapid response called after patient noted to have altered MS and large amount of melena. She was noted to have a drop in her hemoglobin to 7.7. Transferred to MICU where was intubated and an orogastric tube was placed with red blood returned. She was given 4 units PRBS, 2 unit FFP and platelets. Recent cardiac cath noted with CAD but stents not needed and medical management recommended. She is no on any anticoagulants.    REVIEW OF SYSTEMS:  unable to obtain     MEDICATIONS:  MEDICATIONS  (STANDING):  ALBUTerol    0.083% 2.5 milliGRAM(s) Nebulizer every 6 hours  calcium gluconate IVPB 2 Gram(s) IV Intermittent once  dextrose 5%. 1000 milliLiter(s) (50 mL/Hr) IV Continuous <Continuous>  dextrose 50% Injectable 12.5 Gram(s) IV Push once  insulin lispro (HumaLOG) corrective regimen sliding scale   SubCutaneous every 6 hours  LORazepam   Injectable 2 milliGRAM(s) IntraMuscular once  metoclopramide Injectable 10 milliGRAM(s) IV Push every 6 hours  norepinephrine Infusion 0.01 MICROgram(s)/kG/Min (1.174 mL/Hr) IV Continuous <Continuous>  pantoprazole Infusion 8 mG/Hr (10 mL/Hr) IV Continuous <Continuous>  piperacillin/tazobactam IVPB. 3.375 Gram(s) IV Intermittent every 8 hours  potassium chloride   Powder 40 milliEquivalent(s) Oral daily  sodium chloride 0.9% Bolus 1000 milliLiter(s) IV Bolus once  sodium chloride 0.9% Bolus 1000 milliLiter(s) IV Bolus once  sodium chloride 0.9% lock flush 3 milliLiter(s) IV Push every 8 hours  vancomycin  IVPB 750 milliGRAM(s) IV Intermittent every 24 hours    MEDICATIONS  (PRN):  acetaminophen   Tablet 650 milliGRAM(s) Oral every 6 hours PRN For Temp greater than 38 C (100.4 F)  dextrose Gel 1 Dose(s) Oral once PRN Blood Glucose LESS THAN 70 milliGRAM(s)/deciliter  fentaNYL    Injectable 50 MICROGram(s) IV Push every 2 hours PRN RASS 0  glucagon  Injectable 1 milliGRAM(s) IntraMuscular once PRN Glucose LESS THAN 70 milligrams/deciliter  LORazepam   Injectable 1 milliGRAM(s) IV Push every 1 hour PRN Rass 0  ondansetron Injectable 4 milliGRAM(s) IV Push every 6 hours PRN Nausea      Allergies    No Known Allergies    Intolerances        Vital Signs Last 24 Hrs  T(C): 36.9 (05 May 2018 15:55), Max: 36.9 (05 May 2018 09:56)  T(F): 98.5 (05 May 2018 15:55), Max: 98.5 (05 May 2018 15:55)  HR: 118 (05 May 2018 20:40) (76 - 118)  BP: 125/59 (05 May 2018 20:34) (114/38 - 128/81)  BP(mean): 78 (05 May 2018 20:34) (57 - 78)  RR: 29 (05 May 2018 20:34) (15 - 29)  SpO2: 100% (05 May 2018 15:55) (100% - 100%)    05-04 @ 07:01  -  05-05 @ 07:00  --------------------------------------------------------  IN: 545 mL / OUT: 0 mL / NET: 545 mL        PHYSICAL EXAM:    General: very elderly white female on vent unresponsive  HEENT: MMM, conjunctiva pale, orogastric tube withred blood in it  Gastrointestinal:Abdomen: Soft non-tender non-distended; Normal bowel sounds; No hepatosplenomegaly  Extremities: no cyanosis, clubbing or edema.  Skin: Warm and dry. No obvious rash    LABS:  ABG - ( 05 May 2018 21:22 )  pH, Arterial: 7.24  pH, Blood: x     /  pCO2: 46    /  pO2: 147   / HCO3: 18    / Base Excess: -7.4  /  SaO2: 99                  CBC Full  -  ( 05 May 2018 19:55 )  WBC Count : 16.2 K/uL  Hemoglobin : 6.7 g/dL  Hematocrit : 21.0 %  Platelet Count - Automated : 512 K/uL  Mean Cell Volume : 90.1 fl  Mean Cell Hemoglobin : 28.8 pg  Mean Cell Hemoglobin Concentration : 31.9 g/dL  Auto Neutrophil # : 11.2 K/uL  Auto Lymphocyte # : 3.3 K/uL  Auto Monocyte # : 1.1 K/uL  Auto Eosinophil # : 0.3 K/uL  Auto Basophil # : 0.0 K/uL  Auto Neutrophil % : 69.7 %  Auto Lymphocyte % : 20.6 %  Auto Monocyte % : 7.1 %  Auto Eosinophil % : 2.0 %  Auto Basophil % : 0.2 %    05-05    133<L>  |  94<L>  |  39.0<H>  ----------------------------<  196<H>  4.8   |  27.0  |  1.54<H>    Ca    8.1<L>      05 May 2018 19:55  Mg     1.7     05-05    TPro  4.6<L>  /  Alb  2.2<L>  /  TBili  0.4  /  DBili  x   /  AST  44<H>  /  ALT  17  /  AlkPhos  45  05-05    PT/INR - ( 05 May 2018 19:55 )   PT: 13.2 sec;   INR: 1.20 ratio         PTT - ( 05 May 2018 19:55 )  PTT:24.7 sec

## 2018-05-05 NOTE — AIRWAY PLACEMENT NOTE ADULT - POST AIRWAY PLACEMENT ASSESSMENT:
CXR pending/chest excursion noted/breath sounds bilateral/positive end tidal CO2 noted/breath sounds equal/skin color improved

## 2018-05-05 NOTE — PROGRESS NOTE ADULT - PROBLEM SELECTOR PLAN 6
As per cardiology - plan for JEFF, cardiac cath, dobutamine stress test
As per cardiology - plan for JEFF, cardiac cath, dobutamine stress test
EGD on hold for now
Hgb relatively stable  Monitor Hgb and transfuse to keep Hgb >7
Cbc pending from today  Protonix  May proceed with Gi work up if labs from today are satisfactory

## 2018-05-05 NOTE — CHART NOTE - NSCHARTNOTEFT_GEN_A_CORE
Rapid Response PGY 2/ PGY 3 Note  Patient is a 77y old  Female admitted for acute systolic CHF, GI bleed, and valvular dysfunction with NSTEMI s/p cardiac cath yesterday which revealed severe 2VD, moderate AS and severe MR.    Rapid response team called because of decreased responsiveness and lethargy similar to rapid response occurring 2 days ago.  Patient with dark tarry stool. Responsive to verbal stimuli. No acute complaints of chest pain, SOB, abdominal pain, or fevers.  Hospitalist Dr Ramsey at bedside who assessed the patient.        Allergies    No Known Allergies    Intolerances        PAST MEDICAL & SURGICAL HISTORY:  Other type of osteoarthritis  Type 2 diabetes mellitus with complication, without long-term current use of insulin  Essential hypertension  No significant past surgical history      Vital Signs During Rapid  BP 70/40  HR 84  Temp 98.8  RR 22    GENERAL: The patient is lethargic but arousable to verbal and physical stimuli, black tarry stool visualized in the pad.  HEENT: Head is normocephalic and atraumatic.   NECK: Supple.  LUNGS: moderate air entry bilaterally  HEART: Regular rate and rhythm ,+S1/+S2, no murmurs  ABDOMEN: Soft, mild diffuse tenderness, ND, BS+  EXTREMITIES: Without any cyanosis, edema.  SKIN: dry mucus membranes.  cap refill>2 sec  VASCULAR: Radial and Dorsal pedal pulses palpable BL  NEUROLOGIC: AAOx1, moves all extremities purposefully.    05-04 @ 07:01  -  05-05 @ 07:00  --------------------------------------------------------  IN: 545 mL / OUT: 0 mL / NET: 545 mL                              9.0    13.7  )-----------( 472      ( 05 May 2018 06:39 )             27.6     05-05    135  |  95<L>  |  36.0<H>  ----------------------------<  120<H>  3.7   |  29.0  |  1.64<H>    Ca    8.2<L>      05 May 2018 06:40  Mg     1.7     05-05      ABG - ( 05 May 2018 19:43 )  pH, Arterial: 7.47  pH, Blood: x     /  pCO2: 35    /  pO2: 145   / HCO3: 26    / Base Excess: 2.0   /  SaO2: 100           Assessment- Rapid Response called for 77y year old Female with a past medical history of sCHF with EF 30-35%, aortic stenosis currently admitted for acute sCHF and GI Bleed.  Patient with acute GI bleed with hypovolemic shock.        Plan discussed with Dr Ramsey at bedside.  -2L NS bolus with response, repeat BP 84/42 HR 64  -GI called and recommends NGT with lavage, protonix IV BID  - no events noted on monitor  -CBC, BMP, PT/PTT/INR, ABG with lytes-showed acute drop in hemoglobin from 9.0 to 6.9, no hypoxia noted on ABG  -2units pRBC ordered, CT angio of abdomen and pelvis ordered, will obtain who hemodynamically stable  -SROC Dr Sam at bedside who agrees.  -Debrief performed with nursing staff at bedside.  -Patient endorsed to mICU who accepted care of patient.  Management per MICU.    Kwesi PGY2.    Plan- Rapid Response PGY 2/ PGY 3 Note  Patient is a 77y old  Female admitted for acute systolic CHF, GI bleed, and valvular dysfunction with NSTEMI s/p cardiac cath yesterday which revealed severe 2VD, moderate AS and severe MR.    Rapid response team called because of decreased responsiveness and lethargy similar to rapid response occurring 2 days ago.  Patient with dark tarry stool. Responsive to verbal stimuli. No acute complaints of chest pain, SOB, abdominal pain, or fevers.  Hospitalist Dr Ramsey at bedside who assessed the patient.        Allergies    No Known Allergies    Intolerances        PAST MEDICAL & SURGICAL HISTORY:  Other type of osteoarthritis  Type 2 diabetes mellitus with complication, without long-term current use of insulin  Essential hypertension  No significant past surgical history      Vital Signs During Rapid  BP 70/40  HR 84  Temp 98.8  RR 22    GENERAL: The patient is lethargic but arousable to verbal and physical stimuli, black tarry stool visualized in the pad.  HEENT: Head is normocephalic and atraumatic.   NECK: Supple.  LUNGS: moderate air entry bilaterally  HEART: Regular rate and rhythm ,+S1/+S2,   ABDOMEN: Soft, mild diffuse tenderness, ND, BS+  EXTREMITIES: Without any cyanosis, edema.  SKIN: dry mucus membranes.  cap refill>2 sec  VASCULAR: Radial and Dorsal pedal pulses palpable BL  NEUROLOGIC: AAOx1, moves all extremities purposefully.    05-04 @ 07:01  -  05-05 @ 07:00  --------------------------------------------------------  IN: 545 mL / OUT: 0 mL / NET: 545 mL                              9.0    13.7  )-----------( 472      ( 05 May 2018 06:39 )             27.6     05-05    135  |  95<L>  |  36.0<H>  ----------------------------<  120<H>  3.7   |  29.0  |  1.64<H>    Ca    8.2<L>      05 May 2018 06:40  Mg     1.7     05-05      ABG - ( 05 May 2018 19:43 )  pH, Arterial: 7.47  pH, Blood: x     /  pCO2: 35    /  pO2: 145   / HCO3: 26    / Base Excess: 2.0   /  SaO2: 100           Assessment- Rapid Response called for 77y year old Female with a past medical history of sCHF with EF 30-35%, aortic stenosis currently admitted for acute sCHF and GI Bleed.  Patient with acute GI bleed with hypovolemic shock.        Plan discussed with Dr Ramsey at bedside.  -2L NS bolus with response, repeat BP 84/42 HR 64  -GI called and recommends NGT with lavage, protonix IV BID  - no events noted on monitor  -CBC, BMP, PT/PTT/INR, ABG with lytes-showed acute drop in hemoglobin from 9.0 to 6.9, no hypoxia noted on ABG  -2units pRBC ordered, CT angio of abdomen and pelvis ordered, will obtain who hemodynamically stable  -SROC Dr Sam at bedside who agrees.  -Debrief performed with nursing staff at bedside.  -Patient endorsed to mICU who accepted care of patient.  Management per MICU.    Kwesi PGY2.    Plan- Rapid Response PGY 2/ PGY 3 Note  Patient is a 77y old  Female admitted for acute systolic CHF, GI bleed, and valvular dysfunction with NSTEMI s/p cardiac cath yesterday which revealed severe 2VD, moderate AS and severe MR.    Rapid response team called because of decreased responsiveness and lethargy similar to rapid response occurring 2 days ago.  Patient with dark tarry stool. Responsive to verbal stimuli. No acute complaints of chest pain, SOB, abdominal pain, or fevers.  Hospitalist Dr Ramsey at bedside who assessed the patient.        Allergies    No Known Allergies    Intolerances        PAST MEDICAL & SURGICAL HISTORY:  Other type of osteoarthritis  Type 2 diabetes mellitus with complication, without long-term current use of insulin  Essential hypertension  No significant past surgical history      Vital Signs During Rapid  BP 70/40  HR 84  Temp 98.8  RR 22    GENERAL: The patient is lethargic but arousable to verbal and physical stimuli, black tarry stool visualized in the pad.  HEENT: Head is normocephalic and atraumatic.   NECK: Supple.  LUNGS: moderate air entry bilaterally  HEART: Regular rate and rhythm ,+S1/+S2,   ABDOMEN: Soft, mild diffuse tenderness, ND, BS+  EXTREMITIES: Without any cyanosis, edema.  SKIN: dry mucus membranes.  cap refill>2 sec  VASCULAR: Radial and Dorsal pedal pulses palpable BL  NEUROLOGIC: AAOx1, moves all extremities purposefully.    05-04 @ 07:01  -  05-05 @ 07:00  --------------------------------------------------------  IN: 545 mL / OUT: 0 mL / NET: 545 mL                              9.0    13.7  )-----------( 472      ( 05 May 2018 06:39 )             27.6     05-05    135  |  95<L>  |  36.0<H>  ----------------------------<  120<H>  3.7   |  29.0  |  1.64<H>    Ca    8.2<L>      05 May 2018 06:40  Mg     1.7     05-05      ABG - ( 05 May 2018 19:43 )  pH, Arterial: 7.47  pH, Blood: x     /  pCO2: 35    /  pO2: 145   / HCO3: 26    / Base Excess: 2.0   /  SaO2: 100           Assessment- Rapid Response called for 77y year old Female with a past medical history of sCHF with EF 30-35%, aortic stenosis currently admitted for acute sCHF and GI Bleed.  Patient with acute GI bleed with hypovolemic shock.        Plan discussed with Dr Ramsey at bedside.  -2L NS bolus with response, repeat BP 84/42 HR 64  -GI called and recommends NGT with lavage, protonix IV BID  - no events noted on monitor  -CBC, BMP, PT/PTT/INR, ABG with lytes-showed acute drop in hemoglobin from 9.0 to 6.9  -2units pRBC ordered, CT angio of abdomen and pelvis ordered, will obtain who hemodynamically stable  -SROC Dr Sam at bedside who agrees.  -Debrief performed with nursing staff at bedside.  -Patient endorsed to mICU who accepted care of patient.  Management per MICU.    Kwesi PGY2.    Plan- Rapid Response PGY 2/ PGY 3 Note  Patient is a 77y old  Female admitted for acute systolic CHF, GI bleed, and valvular dysfunction with NSTEMI s/p cardiac cath yesterday which revealed severe 2VD, moderate AS and severe MR.    Rapid response team called because of decreased responsiveness and lethargy similar to rapid response occurring 2 days ago.  Patient with dark tarry stool. Responsive to verbal stimuli. No acute complaints of chest pain, SOB, abdominal pain, or fevers.  Hospitalist Dr Ramsey at bedside who assessed the patient.        Allergies    No Known Allergies    Intolerances        PAST MEDICAL & SURGICAL HISTORY:  Other type of osteoarthritis  Type 2 diabetes mellitus with complication, without long-term current use of insulin  Essential hypertension  No significant past surgical history      Vital Signs During Rapid  BP 70/40  HR 84  Temp 98.8  RR 22    GENERAL: The patient is lethargic but arousable to verbal and physical stimuli, black tarry stool visualized in the pad.  HEENT: Head is normocephalic and atraumatic.   NECK: Supple.  LUNGS: moderate air entry bilaterally  HEART: Regular rate and rhythm ,+S1/+S2,   ABDOMEN: Soft, mild diffuse tenderness, ND, BS+  EXTREMITIES: Without any cyanosis, edema.  SKIN: dry mucus membranes.  cap refill>2 sec  VASCULAR: Radial and Dorsal pedal pulses palpable BL  NEUROLOGIC: AAOx1, moves all extremities purposefully.    05-04 @ 07:01  -  05-05 @ 07:00  --------------------------------------------------------  IN: 545 mL / OUT: 0 mL / NET: 545 mL                              9.0    13.7  )-----------( 472      ( 05 May 2018 06:39 )             27.6     05-05    135  |  95<L>  |  36.0<H>  ----------------------------<  120<H>  3.7   |  29.0  |  1.64<H>    Ca    8.2<L>      05 May 2018 06:40  Mg     1.7     05-05      ABG - ( 05 May 2018 19:43 )  pH, Arterial: 7.47  pH, Blood: x     /  pCO2: 35    /  pO2: 145   / HCO3: 26    / Base Excess: 2.0   /  SaO2: 100           Assessment- Rapid Response called for 77y year old Female with a past medical history of sCHF with EF 30-35%, aortic stenosis currently admitted for acute sCHF and GI Bleed.  Patient with acute GI bleed with hypovolemic shock.        Plan discussed with Dr Ramsey at bedside.  -2L NS bolus with response, repeat BP 84/42 HR 64  -GI called and recommends NGT with lavage, protonix IV BID  - no events noted on monitor  -CBC, BMP, PT/PTT/INR, ABG with lytes-showed acute drop in hemoglobin from 9.0 to 6.9  -2units pRBC ordered, CT angio of abdomen and pelvis ordered, will obtain when hemodynamically stable  -SROC Dr Sam at bedside who agrees.  -Debrief performed with nursing staff at bedside.  -Patient endorsed to mICU who accepted care of patient.  Management per MICU.    [facility] PGY2. Rapid Response PGY 2/ PGY 3 Note  Patient is a 77y old  Female admitted for acute systolic CHF, GI bleed, and valvular dysfunction with NSTEMI s/p cardiac cath yesterday which revealed severe 2VD, moderate AS and severe MR.    Rapid response team called because of decreased responsiveness and lethargy similar to rapid response occurring 2 days ago.  Patient with dark tarry stool. Responsive to verbal stimuli. No acute complaints of chest pain, SOB, abdominal pain, or fevers.  Hospitalist Dr Ramsey at bedside who assessed the patient.        Allergies    No Known Allergies    Intolerances        PAST MEDICAL & SURGICAL HISTORY:  Other type of osteoarthritis  Type 2 diabetes mellitus with complication, without long-term current use of insulin  Essential hypertension  No significant past surgical history      Vital Signs During Rapid  BP 70/40  HR 84  Temp 98.8  RR 22    GENERAL: The patient is lethargic but arousable to verbal and physical stimuli, black tarry stool visualized in the pad.  HEENT: Head is normocephalic and atraumatic.   NECK: Supple.  LUNGS: moderate air entry bilaterally  HEART: Regular rate and rhythm ,+S1/+S2,   ABDOMEN: Soft, mild diffuse tenderness, ND, BS+  EXTREMITIES: Without any cyanosis, edema.  SKIN: dry mucus membranes.  cap refill>2 sec  VASCULAR: Radial and Dorsal pedal pulses palpable BL  NEUROLOGIC: AAOx1, moves all extremities purposefully.    05-04 @ 07:01  -  05-05 @ 07:00  --------------------------------------------------------  IN: 545 mL / OUT: 0 mL / NET: 545 mL                              9.0    13.7  )-----------( 472      ( 05 May 2018 06:39 )             27.6     05-05    135  |  95<L>  |  36.0<H>  ----------------------------<  120<H>  3.7   |  29.0  |  1.64<H>    Ca    8.2<L>      05 May 2018 06:40  Mg     1.7     05-05      ABG - ( 05 May 2018 19:43 )  pH, Arterial: 7.47  pH, Blood: x     /  pCO2: 35    /  pO2: 145   / HCO3: 26    / Base Excess: 2.0   /  SaO2: 100           Assessment- Rapid Response called for 77y year old Female with a past medical history of sCHF with EF 30-35%, aortic stenosis currently admitted for acute sCHF and GI Bleed.  Patient with acute GI bleed with hypovolemic shock.        Plan discussed with Dr Ramsey at bedside.  -2L NS bolus with response, repeat BP 84/42 HR 64  -GI called and recommends NGT with lavage, protonix IV BID  - no events noted on monitor  -CBC, BMP, PT/PTT/INR, ABG with lytes-showed acute drop in hemoglobin from 9.0 to 6.9  -2units pRBC ordered, CT angio of abdomen and pelvis ordered, will obtain when hemodynamically stable  -SROC Dr Sam at bedside who agrees.  -Debrief performed with nursing staff at bedside.  -Patient endorsed to MICU who accepted care of patient.  Management per MICU.    Kwesi PGY2.    I was in attendance for the above RRT and am in agreement with the above assessment and plan. Pt in ICU at this time, central line placed, receiving 2 u PRBC and IVF in rapid transfusion with ICU PA at bedside.

## 2018-05-05 NOTE — PROGRESS NOTE ADULT - SUBJECTIVE AND OBJECTIVE BOX
Interval History: 77 year old female admit for SOB and new dx HF with EF 30%                          9.0    13.7  )-----------( 472      ( 05 May 2018 06:39 )             27.6     05-05    135  |  95<L>  |  36.0<H>  ----------------------------<  120<H>  3.7   |  29.0  |  1.64<H>    Ca    8.2<L>      05 May 2018 06:40  Phos  2.0     05-03  Mg     1.7     05-05          MEDICATIONS  (STANDING):  ALBUTerol    0.083% 2.5 milliGRAM(s) Nebulizer every 6 hours  dextrose 5%. 1000 milliLiter(s) (50 mL/Hr) IV Continuous <Continuous>  dextrose 50% Injectable 12.5 Gram(s) IV Push once  insulin lispro (HumaLOG) corrective regimen sliding scale   SubCutaneous every 6 hours  metoprolol succinate ER 25 milliGRAM(s) Oral daily  pantoprazole  Injectable 40 milliGRAM(s) IV Push two times a day  piperacillin/tazobactam IVPB. 3.375 Gram(s) IV Intermittent every 8 hours  potassium chloride   Powder 40 milliEquivalent(s) Oral daily  sodium chloride 0.9% lock flush 3 milliLiter(s) IV Push every 8 hours  vancomycin  IVPB 750 milliGRAM(s) IV Intermittent every 24 hours        Appearance: Normal	  HEENT:   Normal oral mucosa, PERRL  Cardiovascular: Normal S1 S2, No JVD, No murmurs, No edema  Respiratory: Lungs clear to auscultation decreased b/l bases  NC O2 in use.    Psychiatry: A & O x 3, Mood & affect appropriate  Neurologic: Awake, A&O X3.  No neurp deficits  Procedure Site: Left radial dressing removed.  Site benign.  No bleeding/hematoma/ecchymosis.  + palp radial pulse      A/P: S/P LHC which revealed severe 2VD, moderate AS and severe MR.  Medical management.     -cont medication  -radial precautions reviewed  -follow up with Dr. Smith   -Plan per primary service Interval History: 77 year old female admit for SOB and new dx HF with EF 30%                          9.0    13.7  )-----------( 472      ( 05 May 2018 06:39 )             27.6     05-05    135  |  95<L>  |  36.0<H>  ----------------------------<  120<H>  3.7   |  29.0  |  1.64<H>    Ca    8.2<L>      05 May 2018 06:40  Phos  2.0     05-03  Mg     1.7     05-05          MEDICATIONS  (STANDING):  ALBUTerol    0.083% 2.5 milliGRAM(s) Nebulizer every 6 hours  dextrose 5%. 1000 milliLiter(s) (50 mL/Hr) IV Continuous <Continuous>  dextrose 50% Injectable 12.5 Gram(s) IV Push once  insulin lispro (HumaLOG) corrective regimen sliding scale   SubCutaneous every 6 hours  metoprolol succinate ER 25 milliGRAM(s) Oral daily  pantoprazole  Injectable 40 milliGRAM(s) IV Push two times a day  piperacillin/tazobactam IVPB. 3.375 Gram(s) IV Intermittent every 8 hours  potassium chloride   Powder 40 milliEquivalent(s) Oral daily  sodium chloride 0.9% lock flush 3 milliLiter(s) IV Push every 8 hours  vancomycin  IVPB 750 milliGRAM(s) IV Intermittent every 24 hours        Appearance: Normal	  HEENT:   Normal oral mucosa, PERRL  Cardiovascular: Normal S1 S2, No JVD, 11/VI murmur, No edema  Respiratory: Lungs clear to auscultation decreased b/l bases  NC O2 in use.    Psychiatry: A & O x 3, Mood & affect appropriate  Neurologic: Awake, A&O X3.  No neurp deficits  Procedure Site: Left radial dressing removed.  Site benign.  No bleeding/hematoma/ecchymosis.  + palp radial pulse      A/P: S/P LHC which revealed severe 2VD, moderate AS and severe MR.  Medical management.     -cont medication  -radial precautions reviewed  -follow up with Dr. Smith   -Plan per primary service Interval History: 77 year old female admit for SOB and new dx HF with EF 30%                          9.0    13.7  )-----------( 472      ( 05 May 2018 06:39 )             27.6     05-05    135  |  95<L>  |  36.0<H>  ----------------------------<  120<H>  3.7   |  29.0  |  1.64<H>    Ca    8.2<L>      05 May 2018 06:40  Phos  2.0     05-03  Mg     1.7     05-05          MEDICATIONS  (STANDING):  ALBUTerol    0.083% 2.5 milliGRAM(s) Nebulizer every 6 hours  dextrose 5%. 1000 milliLiter(s) (50 mL/Hr) IV Continuous <Continuous>  dextrose 50% Injectable 12.5 Gram(s) IV Push once  insulin lispro (HumaLOG) corrective regimen sliding scale   SubCutaneous every 6 hours  metoprolol succinate ER 25 milliGRAM(s) Oral daily  pantoprazole  Injectable 40 milliGRAM(s) IV Push two times a day  piperacillin/tazobactam IVPB. 3.375 Gram(s) IV Intermittent every 8 hours  potassium chloride   Powder 40 milliEquivalent(s) Oral daily  sodium chloride 0.9% lock flush 3 milliLiter(s) IV Push every 8 hours  vancomycin  IVPB 750 milliGRAM(s) IV Intermittent every 24 hours        Appearance: Normal	  HEENT:   Normal oral mucosa, PERRL  Cardiovascular: Normal S1 S2, No JVD, 11/VI murmur, No edema  Respiratory: Lungs clear to auscultation decreased b/l bases  NC O2 in use.    Psychiatry: A & O x 3, Mood & affect appropriate  Neurologic: Awake, A&O X3.  No neurp deficits  Procedure Site: Left radial dressing removed.  Site benign.  No bleeding/hematoma/ecchymosis.  + palp radial pulse      A/P: S/P LHC which revealed severe 2VD, moderate AS and severe MR.  Medical management.     -cont medication  -radial precautions reviewed  -follow up with Dr. Smith   -Plan per primary service     Patient was seen and examined by me.    MAY PROCEED WITH GI WORK UP.

## 2018-05-06 NOTE — PROGRESS NOTE ADULT - PROVIDER SPECIALTY LIST ADULT
Anesthesia
Anesthesia
Cardiology
Critical Care
Critical Care
Gastroenterology
Hospitalist
Gastroenterology

## 2018-05-06 NOTE — PROGRESS NOTE ADULT - PROBLEM SELECTOR PROBLEM 1
Nola
UGI bleed
UGI bleed
Acute systolic congestive heart failure
CAD (coronary artery disease)
Syncope
Troponin level elevated
Acute systolic congestive heart failure
Cardiogenic shock

## 2018-05-06 NOTE — DISCHARGE NOTE FOR THE EXPIRED PATIENT - SECONDARY DIAGNOSIS.
Acute respiratory failure with hypoxia Encephalopathy acute Gastrointestinal hemorrhage, unspecified gastrointestinal hemorrhage type CAD (coronary artery disease)

## 2018-05-06 NOTE — PROGRESS NOTE ADULT - PROBLEM SELECTOR PROBLEM 4
Acute systolic congestive heart failure
Acute systolic congestive heart failure
Type 2 diabetes mellitus with complication, without long-term current use of insulin
Valvular disease
Acute respiratory failure with hypoxia
HTN (hypertension)

## 2018-05-06 NOTE — CHART NOTE - NSCHARTNOTEFT_GEN_A_CORE
Met with Blayne HCP - discussed transition to comfort measures - Blayne states she told him she would never want to be kept alive on machines, will proceed to comfort measures.

## 2018-05-06 NOTE — PROGRESS NOTE ADULT - PROBLEM SELECTOR PLAN 3
As per cardiology - plan for JEFF, cardiac cath, dobutamine stress test
Hgb 10.1  S/p 5u PRBC this admission  Monitor Hgb and transfuse to keep Hgb >7
Hgb 9  S/p 5u PRBC this admission  Monitor Hgb and transfuse to keep Hgb >9
Procalcitonin elevated   +leukocytosis  Afebrile  Blood cultures pending  Repeat CXR from this morning showed improvement  C/w vanc and zosyn day 4/7
Continue piperacillin/tazobactam and vancomycin  Incentive spirometry
on maximal support

## 2018-05-06 NOTE — PROGRESS NOTE ADULT - SUBJECTIVE AND OBJECTIVE BOX
Late entry,    Patient seen this am for shock MSOF in setting of significant  UGIB requiring 8 unit of packed cells in 8 hrs.    She developed refractory shock despite adequate transfusion resus.      Severe AG lactic acidosis with STEFANI anuria and hypotension requiring 3 pressor.  POCUS showed severe global LV dysfx in this patient  with known 2V CAD recent NSTEMI and moderate to severe AS/MR.        Discussion with patient's HCP Blayne Norton.  Made DNR.   Asked him to come to hospital as she was not expected to survive.     CCT 35 min

## 2018-05-06 NOTE — PROGRESS NOTE ADULT - PROBLEM SELECTOR PROBLEM 5
Gastrointestinal hemorrhage, unspecified gastrointestinal hemorrhage type
PNA (pneumonia)
PNA (pneumonia)
Type 2 diabetes mellitus with complication, without long-term current use of insulin
Encephalopathy acute
Hyponatremia

## 2018-05-06 NOTE — PROVIDER CONTACT NOTE (EICU) - ASSESSMENT
Critical care PA at bedside actively managing patient, pt post intubation.
Pt in bed, intubated in no apparent distress. On 1.5microgram/kg/min Levophed and Vaso. Systolic 60s-70s. HR 120s. 100%. Bedside ultrasound consistent with severe LV dysfunction. Hemoglobin repeat 10.4-->11.8

## 2018-05-06 NOTE — DISCHARGE NOTE FOR THE EXPIRED PATIENT - HOSPITAL COURSE
For full details see h and P and progress notes, briefly patient 77F hx HTN DM OA admit with FTT/CHF/PNA/NSTEMI/GIB.   Bled a few days ago required transfusion.  EGD not done do to MI and D. Transferred to the ICU for acute GI bleed, went into multisystem organ failure, multi-pressors, HCP Roosevelt Norton came in discussed poor prognosis, he stated the patient would never want to be on machines like this - proceeded to comfort measures marlenejordyn  at 1:22 with roosevelt at her side.

## 2018-05-06 NOTE — PROGRESS NOTE ADULT - PROBLEM SELECTOR PLAN 1
multi-vasopressors no escalation at this point will not help the patietn - poor prognosis HCP Blayne white

## 2018-05-06 NOTE — PROGRESS NOTE ADULT - SUBJECTIVE AND OBJECTIVE BOX
Patient is a 77y old  Female who presents with a chief complaint of weakness, fall (28 Apr 2018 01:55)      BRIEF HOSPITAL COURSE: 77F hx HTN DM OA admit with FTT/CHF/PNA/NSTEMI/GIB.   Bled a few days ago required transfusion.  EGD not done do to MI and VHD. Transferred to the ICU for acute GI bleed, went into multisystem organ failure, multi-pressors, now DNR    Events last 24 hours: above    PAST MEDICAL & SURGICAL HISTORY:  Other type of osteoarthritis  Type 2 diabetes mellitus with complication, without long-term current use of insulin  Essential hypertension  No significant past surgical history      Review of Systems:  unable to obtain    Medications:  piperacillin/tazobactam IVPB. 3.375 Gram(s) IV Intermittent every 8 hours  vancomycin  IVPB 750 milliGRAM(s) IV Intermittent every 24 hours    EPINEPHrine     Infusion 0.5 MICROgram(s)/kG/Min IV Continuous <Continuous>  norepinephrine Infusion 0.01 MICROgram(s)/kG/Min IV Continuous <Continuous>    ALBUTerol    0.083% 2.5 milliGRAM(s) Nebulizer every 6 hours    acetaminophen   Tablet 650 milliGRAM(s) Oral every 6 hours PRN  fentaNYL    Injectable 50 MICROGram(s) IV Push every 2 hours PRN  LORazepam   Injectable 1 milliGRAM(s) IV Push every 1 hour PRN  metoclopramide Injectable 10 milliGRAM(s) IV Push every 6 hours        pantoprazole Infusion 8 mG/Hr IV Continuous <Continuous>      dextrose 50% Injectable 12.5 Gram(s) IV Push once  dextrose Gel 1 Dose(s) Oral once PRN  glucagon  Injectable 1 milliGRAM(s) IntraMuscular once PRN  insulin lispro (HumaLOG) corrective regimen sliding scale   SubCutaneous every 6 hours  vasopressin Infusion 0.04 Unit(s)/Min IV Continuous <Continuous>    dextrose 5%. 1000 milliLiter(s) IV Continuous <Continuous>  sodium chloride 0.9% lock flush 3 milliLiter(s) IV Push every 8 hours            Mode: AC/ CMV (Assist Control/ Continuous Mandatory Ventilation)  RR (machine): 16  TV (machine): 450  FiO2: 50  PEEP: 5  MAP: 11  PIP: 24      ICU Vital Signs Last 24 Hrs  T(C): 37.7 (06 May 2018 07:00), Max: 38.4 (06 May 2018 04:00)  T(F): 99.9 (06 May 2018 07:00), Max: 101.1 (06 May 2018 04:00)  HR: 103 (06 May 2018 07:00) (77 - 143)  BP: 90/65 (06 May 2018 07:00) (70/40 - 135/59)  BP(mean): 72 (06 May 2018 07:00) (55 - 85)  ABP: --  ABP(mean): --  RR: 33 (06 May 2018 07:00) (15 - 40)  SpO2: 100% (06 May 2018 07:00) (100% - 100%)      ABG - ( 06 May 2018 04:30 )  pH, Arterial: 7.20  pH, Blood: x     /  pCO2: 21    /  pO2: 172   / HCO3: 11    / Base Excess: -18.1 /  SaO2: 99                  I&O's Detail    05 May 2018 07:01  -  06 May 2018 07:00  --------------------------------------------------------  IN:    Oral Fluid: 720 mL  Total IN: 720 mL    OUT:  Total OUT: 0 mL    Total NET: 720 mL            LABS:                        9.6    23.0  )-----------( 249      ( 06 May 2018 06:49 )             28.7     05-06    141  |  100  |  37.0<H>  ----------------------------<  159<H>  4.6   |  14.0<L>  |  1.86<H>    Ca    6.7<L>      06 May 2018 06:49  Mg     5.7     05-06    TPro  4.6<L>  /  Alb  2.2<L>  /  TBili  0.4  /  DBili  x   /  AST  44<H>  /  ALT  17  /  AlkPhos  45  05-05      CARDIAC MARKERS ( 06 May 2018 06:49 )  x     / 3.57 ng/mL / 92 U/L / x     / x      CARDIAC MARKERS ( 05 May 2018 23:13 )  x     / 1.98 ng/mL / x     / x     / x          CAPILLARY BLOOD GLUCOSE      POCT Blood Glucose.: 161 mg/dL (06 May 2018 05:34)    PT/INR - ( 06 May 2018 06:49 )   PT: 24.7 sec;   INR: 2.21 ratio         PTT - ( 06 May 2018 06:49 )  PTT:42.4 sec    CULTURES:  Rapid RVP Result: NotDetec (05-01-18 @ 18:41)  Culture Results:   No growth at 5 days. (04-30-18 @ 16:42)  Culture Results:   No growth at 5 days. (04-30-18 @ 16:42)      Physical Examination:    General: No acute distress.      HEENT: Pupils equal, reactive to light.  Symmetric.    PULM: Clear to auscultation bilaterally, no significant sputum production    CVS: Regular rate and rhythm, no murmurs, rubs, or gallops    ABD: Soft, nondistended, nontender, normoactive bowel sounds, no masses    EXT: No edema, nontender    SKIN: Warm and well perfused, no rashes noted.        CRITICAL CARE TIME SPENT: Patient is a 77y old  Female who presents with a chief complaint of weakness, fall (28 Apr 2018 01:55)      BRIEF HOSPITAL COURSE: 77F hx HTN DM OA admit with FTT/CHF/PNA/NSTEMI/GIB.   Bled a few days ago required transfusion.  EGD not done do to MI and VHD. Transferred to the ICU for acute GI bleed, went into multisystem organ failure, multi-pressors, now DNR    Events last 24 hours: above    PAST MEDICAL & SURGICAL HISTORY:  Other type of osteoarthritis  Type 2 diabetes mellitus with complication, without long-term current use of insulin  Essential hypertension  No significant past surgical history      Review of Systems:  unable to obtain    Medications:  piperacillin/tazobactam IVPB. 3.375 Gram(s) IV Intermittent every 8 hours  vancomycin  IVPB 750 milliGRAM(s) IV Intermittent every 24 hours    EPINEPHrine     Infusion 0.5 MICROgram(s)/kG/Min IV Continuous <Continuous>  norepinephrine Infusion 0.01 MICROgram(s)/kG/Min IV Continuous <Continuous>    ALBUTerol    0.083% 2.5 milliGRAM(s) Nebulizer every 6 hours    acetaminophen   Tablet 650 milliGRAM(s) Oral every 6 hours PRN  fentaNYL    Injectable 50 MICROGram(s) IV Push every 2 hours PRN  LORazepam   Injectable 1 milliGRAM(s) IV Push every 1 hour PRN  metoclopramide Injectable 10 milliGRAM(s) IV Push every 6 hours        pantoprazole Infusion 8 mG/Hr IV Continuous <Continuous>      dextrose 50% Injectable 12.5 Gram(s) IV Push once  dextrose Gel 1 Dose(s) Oral once PRN  glucagon  Injectable 1 milliGRAM(s) IntraMuscular once PRN  insulin lispro (HumaLOG) corrective regimen sliding scale   SubCutaneous every 6 hours  vasopressin Infusion 0.04 Unit(s)/Min IV Continuous <Continuous>    dextrose 5%. 1000 milliLiter(s) IV Continuous <Continuous>  sodium chloride 0.9% lock flush 3 milliLiter(s) IV Push every 8 hours            Mode: AC/ CMV (Assist Control/ Continuous Mandatory Ventilation)  RR (machine): 16  TV (machine): 450  FiO2: 50  PEEP: 5  MAP: 11  PIP: 24      ICU Vital Signs Last 24 Hrs  T(C): 37.7 (06 May 2018 07:00), Max: 38.4 (06 May 2018 04:00)  T(F): 99.9 (06 May 2018 07:00), Max: 101.1 (06 May 2018 04:00)  HR: 103 (06 May 2018 07:00) (77 - 143)  BP: 90/65 (06 May 2018 07:00) (70/40 - 135/59)  BP(mean): 72 (06 May 2018 07:00) (55 - 85)  ABP: --  ABP(mean): --  RR: 33 (06 May 2018 07:00) (15 - 40)  SpO2: 100% (06 May 2018 07:00) (100% - 100%)      ABG - ( 06 May 2018 04:30 )  pH, Arterial: 7.20  pH, Blood: x     /  pCO2: 21    /  pO2: 172   / HCO3: 11    / Base Excess: -18.1 /  SaO2: 99                  I&O's Detail    05 May 2018 07:01  -  06 May 2018 07:00  --------------------------------------------------------  IN:    Oral Fluid: 720 mL  Total IN: 720 mL    OUT:  Total OUT: 0 mL    Total NET: 720 mL            LABS:                        9.6    23.0  )-----------( 249      ( 06 May 2018 06:49 )             28.7     05-06    141  |  100  |  37.0<H>  ----------------------------<  159<H>  4.6   |  14.0<L>  |  1.86<H>    Ca    6.7<L>      06 May 2018 06:49  Mg     5.7     05-06    TPro  4.6<L>  /  Alb  2.2<L>  /  TBili  0.4  /  DBili  x   /  AST  44<H>  /  ALT  17  /  AlkPhos  45  05-05      CARDIAC MARKERS ( 06 May 2018 06:49 )  x     / 3.57 ng/mL / 92 U/L / x     / x      CARDIAC MARKERS ( 05 May 2018 23:13 )  x     / 1.98 ng/mL / x     / x     / x          CAPILLARY BLOOD GLUCOSE      POCT Blood Glucose.: 161 mg/dL (06 May 2018 05:34)    PT/INR - ( 06 May 2018 06:49 )   PT: 24.7 sec;   INR: 2.21 ratio         PTT - ( 06 May 2018 06:49 )  PTT:42.4 sec    CULTURES:  Rapid RVP Result: NotDetec (05-01-18 @ 18:41)  Culture Results:   No growth at 5 days. (04-30-18 @ 16:42)  Culture Results:   No growth at 5 days. (04-30-18 @ 16:42)      Physical Examination:    General: No acute distress.      HEENT: Pupils equal, reactive to light.  Symmetric.    PULM: course diminished    CVS: Regular rate and rhythm, no murmurs, rubs, or gallops    ABD: Soft, nondistended, nontender, normoactive bowel sounds, no masses    EXT: No edema, nontender    SKIN: cold dusky extremities        CRITICAL CARE TIME SPENT: 35 min

## 2018-05-06 NOTE — PROGRESS NOTE ADULT - PROBLEM SELECTOR PROBLEM 3
Anemia due to blood loss
Anemia due to blood loss
PNA (pneumonia)
Valvular disease
Acute systolic congestive heart failure
PNA (pneumonia)

## 2018-05-06 NOTE — PROGRESS NOTE ADULT - SUBJECTIVE AND OBJECTIVE BOX
Pt seen and examined f/u UGI bleeding  This morning I came in to perform EGD at 7AM but was advised patient in cardiogenic shock and not expected to survive. MICU team does not wish for patient ot undergo EGD at this time. NG with small amount of blood in it. Patient on vent, unresponsive  and on pressors to maintain BP of 90/60 now. T max last nite 101    REVIEW OF SYSTEMS: unable to obtain        MEDICATIONS:  MEDICATIONS  (STANDING):  ALBUTerol    0.083% 2.5 milliGRAM(s) Nebulizer every 6 hours  dextrose 5%. 1000 milliLiter(s) (50 mL/Hr) IV Continuous <Continuous>  dextrose 50% Injectable 12.5 Gram(s) IV Push once  EPINEPHrine     Infusion 0.5 MICROgram(s)/kG/Min (117.375 mL/Hr) IV Continuous <Continuous>  insulin lispro (HumaLOG) corrective regimen sliding scale   SubCutaneous every 6 hours  metoclopramide Injectable 10 milliGRAM(s) IV Push every 6 hours  norepinephrine Infusion 0.01 MICROgram(s)/kG/Min (1.174 mL/Hr) IV Continuous <Continuous>  pantoprazole Infusion 8 mG/Hr (10 mL/Hr) IV Continuous <Continuous>  piperacillin/tazobactam IVPB. 3.375 Gram(s) IV Intermittent every 8 hours  sodium chloride 0.9% lock flush 3 milliLiter(s) IV Push every 8 hours  vancomycin  IVPB 750 milliGRAM(s) IV Intermittent every 24 hours  vasopressin Infusion 0.04 Unit(s)/Min (2.4 mL/Hr) IV Continuous <Continuous>    MEDICATIONS  (PRN):  acetaminophen   Tablet 650 milliGRAM(s) Oral every 6 hours PRN For Temp greater than 38 C (100.4 F)  dextrose Gel 1 Dose(s) Oral once PRN Blood Glucose LESS THAN 70 milliGRAM(s)/deciliter  fentaNYL    Injectable 50 MICROGram(s) IV Push every 2 hours PRN RASS 0  glucagon  Injectable 1 milliGRAM(s) IntraMuscular once PRN Glucose LESS THAN 70 milligrams/deciliter  LORazepam   Injectable 1 milliGRAM(s) IV Push every 1 hour PRN Rass 0      Allergies    No Known Allergies    Intolerances        Vital Signs Last 24 Hrs  T(C): 37.7 (06 May 2018 07:00), Max: 38.4 (06 May 2018 04:00)  T(F): 99.9 (06 May 2018 07:00), Max: 101.1 (06 May 2018 04:00)  HR: 103 (06 May 2018 07:00) (77 - 143)  BP: 90/65 (06 May 2018 07:00) (70/40 - 135/59)  BP(mean): 72 (06 May 2018 07:00) (55 - 85)  RR: 33 (06 May 2018 07:00) (15 - 40)  SpO2: 100% (06 May 2018 07:00) (100% - 100%)    05-05 @ 07:01  -  05-06 @ 07:00  --------------------------------------------------------  IN: 720 mL / OUT: 0 mL / NET: 720 mL        PHYSICAL EXAM:    General: elderly white female unresponsive on vent  HEENT: MMM, conjunctiva and sclera clear, ET tube present as well as orogastric tube with small amount of blood in it  Gastrointestinal:Abdomen: Soft non-tender non-distended; Normal bowel sounds; No hepatosplenomegaly  Extremities: no cyanosis, clubbing or edema.  Skin: Warm and dry. No obvious rash    LABS:  ABG - ( 06 May 2018 04:30 )  pH, Arterial: 7.20  pH, Blood: x     /  pCO2: 21    /  pO2: 172   / HCO3: 11    / Base Excess: -18.1 /  SaO2: 99                  CBC Full  -  ( 06 May 2018 06:49 )  WBC Count : 23.0 K/uL  Hemoglobin : 9.6 g/dL  Hematocrit : 28.7 %  Platelet Count - Automated : 249 K/uL  Mean Cell Volume : 87.5 fl  Mean Cell Hemoglobin : 29.3 pg  Mean Cell Hemoglobin Concentration : 33.4 g/dL  Auto Neutrophil # : x  Auto Lymphocyte # : x  Auto Monocyte # : x  Auto Eosinophil # : x  Auto Basophil # : x  Auto Neutrophil % : x  Auto Lymphocyte % : x  Auto Monocyte % : x  Auto Eosinophil % : x  Auto Basophil % : x    05-06    141  |  100  |  37.0<H>  ----------------------------<  159<H>  4.6   |  14.0<L>  |  1.86<H>    Ca    6.7<L>      06 May 2018 06:49  Mg     5.7     05-06    TPro  4.6<L>  /  Alb  2.2<L>  /  TBili  0.4  /  DBili  x   /  AST  44<H>  /  ALT  17  /  AlkPhos  45  05-05    PT/INR - ( 06 May 2018 06:49 )   PT: 24.7 sec;   INR: 2.21 ratio         PTT - ( 06 May 2018 06:49 )  PTT:42.4 sec

## 2018-05-06 NOTE — PROGRESS NOTE ADULT - PROBLEM SELECTOR PLAN 4
As per cardiology - plan for JEFF, cardiac cath, dobutamine stress test
NYHA 4  EF 30-35%  C/w bumex    C/w metoprolol, lisinopril  Wean off oxygen as tolerated
NYHA 4  EF 30-35%  C/w metoprolol, lisinopril  Wean off oxygen as tolerated
sliding scale
Controlled with Lisinorpril and metoprolol
MV as tolerated

## 2018-05-06 NOTE — PROGRESS NOTE ADULT - ASSESSMENT
77 yof with multisystem organ failure, GIB, MI, cardiogenic shock - DNR no escalation of care poor prognosis

## 2018-05-06 NOTE — PROVIDER CONTACT NOTE (EICU) - ACTION/TREATMENT ORDERED:
Epi drip added, trop/ekg/cpk labs ordered. Epi drip added, trop/ekg/cpk labs ordered.    2amps sodium bicarb ordered

## 2018-05-06 NOTE — PROGRESS NOTE ADULT - PROBLEM SELECTOR PLAN 5
EGD on hold for now
Procalcitonin elevated   +leukocytosis  Afebrile  Blood cultures pending  Repeat CXR from this morning showed improvement  C/w vanc and zosyn day 4/7
Procalcitonin elevated   +leukocytosis  Afebrile  Blood cultures pending  Repeat CXR from this morning showed improvement  C/w vanc and zosyn day 4/7
sliding scale
Hypervolemic hyponatremia   improved
secondary to MSOD

## 2018-05-06 NOTE — PROGRESS NOTE ADULT - PROBLEM SELECTOR PLAN 1
appears to have slowed or stopped with major issue once again her poor cardiac status and now with cardiogenic shock. Agree with current therapy. Prognosis grave.

## 2018-05-06 NOTE — PROGRESS NOTE ADULT - PROBLEM SELECTOR PLAN 2
EGD on hold for now
EGD on hold for now
NYHA 4  EF 30-35%  C/w bumex    C/w metoprolol, lisinopril  Wean off oxygen as tolerated
Procalcitonin elevated   +leukocytosis  Afebrile  Blood cultures pending  Repeat CXR in am  C/w vanc and zosyn day 3/7
Pranay AS, MR  Plan JEFF
s/p transfusion, no EGD at this time

## 2018-05-23 PROCEDURE — 85027 COMPLETE CBC AUTOMATED: CPT

## 2018-05-23 PROCEDURE — 84295 ASSAY OF SERUM SODIUM: CPT

## 2018-05-23 PROCEDURE — 86850 RBC ANTIBODY SCREEN: CPT

## 2018-05-23 PROCEDURE — P9059: CPT

## 2018-05-23 PROCEDURE — 82962 GLUCOSE BLOOD TEST: CPT

## 2018-05-23 PROCEDURE — 83930 ASSAY OF BLOOD OSMOLALITY: CPT

## 2018-05-23 PROCEDURE — 87486 CHLMYD PNEUM DNA AMP PROBE: CPT

## 2018-05-23 PROCEDURE — 84443 ASSAY THYROID STIM HORMONE: CPT

## 2018-05-23 PROCEDURE — 86985 SPLIT BLOOD OR PRODUCTS: CPT

## 2018-05-23 PROCEDURE — 71045 X-RAY EXAM CHEST 1 VIEW: CPT

## 2018-05-23 PROCEDURE — 82330 ASSAY OF CALCIUM: CPT

## 2018-05-23 PROCEDURE — 87633 RESP VIRUS 12-25 TARGETS: CPT

## 2018-05-23 PROCEDURE — 99291 CRITICAL CARE FIRST HOUR: CPT | Mod: 25

## 2018-05-23 PROCEDURE — 84100 ASSAY OF PHOSPHORUS: CPT

## 2018-05-23 PROCEDURE — 93306 TTE W/DOPPLER COMPLETE: CPT

## 2018-05-23 PROCEDURE — 83036 HEMOGLOBIN GLYCOSYLATED A1C: CPT

## 2018-05-23 PROCEDURE — 36600 WITHDRAWAL OF ARTERIAL BLOOD: CPT

## 2018-05-23 PROCEDURE — 85610 PROTHROMBIN TIME: CPT

## 2018-05-23 PROCEDURE — 31500 INSERT EMERGENCY AIRWAY: CPT

## 2018-05-23 PROCEDURE — 80202 ASSAY OF VANCOMYCIN: CPT

## 2018-05-23 PROCEDURE — 86923 COMPATIBILITY TEST ELECTRIC: CPT

## 2018-05-23 PROCEDURE — C1769: CPT

## 2018-05-23 PROCEDURE — 93005 ELECTROCARDIOGRAM TRACING: CPT

## 2018-05-23 PROCEDURE — 94003 VENT MGMT INPAT SUBQ DAY: CPT

## 2018-05-23 PROCEDURE — 36415 COLL VENOUS BLD VENIPUNCTURE: CPT

## 2018-05-23 PROCEDURE — 82435 ASSAY OF BLOOD CHLORIDE: CPT

## 2018-05-23 PROCEDURE — 82436 ASSAY OF URINE CHLORIDE: CPT

## 2018-05-23 PROCEDURE — 84132 ASSAY OF SERUM POTASSIUM: CPT

## 2018-05-23 PROCEDURE — 87798 DETECT AGENT NOS DNA AMP: CPT

## 2018-05-23 PROCEDURE — 80307 DRUG TEST PRSMV CHEM ANLYZR: CPT

## 2018-05-23 PROCEDURE — 36430 TRANSFUSION BLD/BLD COMPNT: CPT

## 2018-05-23 PROCEDURE — 70450 CT HEAD/BRAIN W/O DYE: CPT

## 2018-05-23 PROCEDURE — P9011: CPT

## 2018-05-23 PROCEDURE — 86900 BLOOD TYPING SEROLOGIC ABO: CPT

## 2018-05-23 PROCEDURE — 87581 M.PNEUMON DNA AMP PROBE: CPT

## 2018-05-23 PROCEDURE — 82803 BLOOD GASES ANY COMBINATION: CPT

## 2018-05-23 PROCEDURE — 94760 N-INVAS EAR/PLS OXIMETRY 1: CPT

## 2018-05-23 PROCEDURE — P9016: CPT

## 2018-05-23 PROCEDURE — P9037: CPT

## 2018-05-23 PROCEDURE — 94640 AIRWAY INHALATION TREATMENT: CPT

## 2018-05-23 PROCEDURE — 83935 ASSAY OF URINE OSMOLALITY: CPT

## 2018-05-23 PROCEDURE — 84300 ASSAY OF URINE SODIUM: CPT

## 2018-05-23 PROCEDURE — C1887: CPT

## 2018-05-23 PROCEDURE — 80053 COMPREHEN METABOLIC PANEL: CPT

## 2018-05-23 PROCEDURE — 83735 ASSAY OF MAGNESIUM: CPT

## 2018-05-23 PROCEDURE — 85014 HEMATOCRIT: CPT

## 2018-05-23 PROCEDURE — 83605 ASSAY OF LACTIC ACID: CPT

## 2018-05-23 PROCEDURE — 84145 PROCALCITONIN (PCT): CPT

## 2018-05-23 PROCEDURE — 93458 L HRT ARTERY/VENTRICLE ANGIO: CPT

## 2018-05-23 PROCEDURE — 82947 ASSAY GLUCOSE BLOOD QUANT: CPT

## 2018-05-23 PROCEDURE — 82140 ASSAY OF AMMONIA: CPT

## 2018-05-23 PROCEDURE — 80048 BASIC METABOLIC PNL TOTAL CA: CPT

## 2018-05-23 PROCEDURE — 94770: CPT

## 2018-05-23 PROCEDURE — 82272 OCCULT BLD FECES 1-3 TESTS: CPT

## 2018-05-23 PROCEDURE — 94002 VENT MGMT INPAT INIT DAY: CPT

## 2018-05-23 PROCEDURE — 84540 ASSAY OF URINE/UREA-N: CPT

## 2018-05-23 PROCEDURE — 90715 TDAP VACCINE 7 YRS/> IM: CPT

## 2018-05-23 PROCEDURE — 82550 ASSAY OF CK (CPK): CPT

## 2018-05-23 PROCEDURE — 85018 HEMOGLOBIN: CPT

## 2018-05-23 PROCEDURE — 86901 BLOOD TYPING SEROLOGIC RH(D): CPT

## 2018-05-23 PROCEDURE — C1894: CPT

## 2018-05-23 PROCEDURE — 71046 X-RAY EXAM CHEST 2 VIEWS: CPT

## 2018-05-23 PROCEDURE — 87040 BLOOD CULTURE FOR BACTERIA: CPT

## 2018-05-23 PROCEDURE — 85730 THROMBOPLASTIN TIME PARTIAL: CPT

## 2018-05-23 PROCEDURE — 84484 ASSAY OF TROPONIN QUANT: CPT

## 2020-08-24 NOTE — PATIENT PROFILE ADULT. - FUNCTIONAL SCREEN CURRENT LEVEL: SWALLOWING (IF SCORE 2 OR MORE FOR ANY ITEM, CONSULT REHAB SERVICES), MLM)
Report called to [facility] [place] at [facility]. No further questions. Gay Morgan (0) swallows foods/liquids without difficulty

## 2021-12-31 NOTE — PROGRESS NOTE ADULT - PROBLEM SELECTOR PROBLEM 2
Acute systolic congestive heart failure
Gastrointestinal hemorrhage, unspecified gastrointestinal hemorrhage type
Gastrointestinal hemorrhage, unspecified gastrointestinal hemorrhage type
PNA (pneumonia)
UGI bleed
Valvular disease
7

## 2022-10-11 NOTE — PROGRESS NOTE ADULT - ASSESSMENT
[facility] INTERNAL MEDICINE    Visit Date:  10/11/2022  Patient:  Marcela Araya  YOB: 1951    Assessment & Plan     GERD:  Can continue Pepcid as needed. Reassess next visit. Hyperlipidemia: Continue lovastatin. We will continue to monitor. .    Atrial fibrillation: Regular rate and rhythm at this time. Continue Pradaxa. Diagnosis Orders   1. Hyperlipidemia, unspecified hyperlipidemia type  CBC with Auto Differential    Comprehensive Metabolic Panel    Lipid Panel      2. IFG (impaired fasting glucose)  Hemoglobin A1C          Chief Complaint  3 Month Follow-Up    History of Present Illness   Presents to follow-up. Says that he is doing okay at this time. Has no symptoms at this time. He was previously anemic, but that improved, his iron level is now within normal.  Has a history of GERD, hyperlipidemia, atrial fibrillation that are unchanged. Objective  /80 (Site: Left Upper Arm, Position: Sitting, Cuff Size: Medium Adult)   Pulse 68   Resp 16   Ht 5' 7\" (1.702 m)   Wt 194 lb 9.6 oz (88.3 kg)   SpO2 95%   BMI 30.48 kg/m²   Constitutional: No acute distress. Sits in chair comfortably  Eyes: Sclerae nonicteric. No lid lag or proptosis  HENT: External ears normal. No external lesions on the nose  Neck: No gross masses. Trachea visibly midline  Respiratory: Good air entry bilaterally. No wheezing or crackles  Cardiovascular: Normal S1-S2. No murmurs. No lower extremity edema  Gastrointestinal: No visible masses. No visible hernias  Skin: No abnormal rashes. No abnormal masses  Neurologic: Cranial nerves grossly intact  Psychiatric: Normal affect.  Alert and oriented    Medications  Current Outpatient Medications:     zinc 50 MG CAPS, Take 1 tablet by mouth daily, Disp: , Rfl:     aspirin 81 MG EC tablet, Take 1 tablet by mouth daily, Disp: 90 tablet, Rfl: 3    dabigatran (PRADAXA) 150 MG capsule, Take 1 capsule by mouth 2 times daily, Disp: 180 capsule, Rfl: 3    Omega-3 Fatty Acids (FISH OIL) 1000 MG CAPS, Take 1 capsule by mouth 2 times daily, Disp: 180 capsule, Rfl: 3    atorvastatin (LIPITOR) 40 MG tablet, Take 1 tablet by mouth nightly, Disp: 90 tablet, Rfl: 3    famotidine (PEPCID) 20 MG tablet, Take 1 tablet by mouth 2 times daily, Disp: 60 tablet, Rfl: 0    latanoprost (XALATAN) 0.005 % ophthalmic solution, Place 1 drop into both eyes nightly, Disp: , Rfl:     Cholecalciferol (VITAMIN D3) 25 MCG (1000 UT) CAPS, Take 2,000 Units by mouth daily, Disp: , Rfl:     VITAMIN B1-B12 IM, Inject into the muscle every 30 days , Disp: , Rfl:     Allergies  Patient has no known allergies. Past Medical History:   Diagnosis Date    Atrial fibrillation ([facility] [address] 01/2020    Blood in urine     Chronic kidney disease     CKD (chronic kidney disease)     History of ischemic stroke in prior three months     Hyperlipidemia     Ischemic stroke ([facility] [address] 01/10/2020    Kidney stones     Nihss score 10     PTSD (post-traumatic stress disorder)     from war, [place]     Skin tag 12/21/2018    Stroke ([facility] [address] 01/16/2020    MRI Brain WO: New right PCA distribution infarct, multifocal left MCA distribution infarcts    Stroke ([facility] [address] 01/11/2020    large vessel occlusion/left M2 occlusion. He subsequently underwent emergent thrombectomy.  /  S/P TPA DONE     Past Surgical History:   Procedure Laterality Date    APPENDECTOMY      COLONOSCOPY N/A 4/27/2021    Normal colon.  Dr. Ning Valdivia at . [address] Left 10/19/2020    CYSTOSCOPY, LEFT URETEROSCOPY w/ [place] LITHOTRIPSY, LEFT URETERAL STENT PLACEMENT performed by Mundo Davison MD at [address] Left 12/18/2020    cystoscopy, left stent placement, left retrograde pyelogram    CYSTOSCOPY Left 12/18/2020    CYSTOSCOPY, LEFT STENT REMOVAL, LEFT RETROGRADE PYELOGRAM, INSERTION OCCLUSIVE BALLOON - PT TO INTERV RADIOLOGY FOLLOWING performed by Mundo Davison MD at [address] [place] [address] / [place] / aZyda Lieberman Left 2/1/2021    CYSTO 76 yo F here with acute systolic HF, GIB, valvular dysfunction Left URETEROSCOPY w/ stent removal ET RETROGRADE performed by Mike Navas MD at [address] NEPHROSTOMY PERCUTANEOUS LEFT  12/18/2020    IR NEPHROSTOMY PERCUTANEOUS LEFT 12/18/2020 Terence Del Rosario MD [facility]    LITHOTRIPSY      NEPHROLITHOTOMY Left 12/18/2020    cystoscopy, left stent placement, left retrograde pyelogram performed by Mike Navas MD at [facility]    TRANSESOPHAGEAL ECHOCARDIOGRAM  01/17/2020    TURP N/A 08/10/2020    CYSTO w/ [place] performed by Miek Navas MD at [address] [facility] [address] N/A 4/27/2021    mild esophagitis. mild-mod. esophagitis. Dr. Anjana Llanes at [facility]     Family History  This patient's family history includes Heart Disease in his father and mother; Stroke in his brother and father. Social History  Misty Ibrahim  reports that he has never smoked. He has never used smokeless tobacco. He reports current alcohol use. He reports that he does not use drugs. Discussed use, benefit, and side effects of prescribed medications. All questions answered. Patient voiced understanding. Reviewed health maintenance. Electronically signed Dodie Aparicio MD on 10/11/2022 at 2:51 PM    This note has been created using the Epic electronic health record, and dictated in part by [employer] dictation system. Despite the documenting physician's best efforts, there may be errors in spelling, grammar or syntax.

## 2024-07-22 NOTE — DIETITIAN INITIAL EVALUATION ADULT. - PROBLEM SELECTOR PROBLEM 1
Airway  Date/Time: 7/22/2024 2:39 PM  Urgency: elective    Airway not difficult    Staffing  Performed: CRNA   Authorized by: Reza Vincent MD    Performed by: JORDYN Cristobal-ROBLES  Patient location during procedure: OR    Indications and Patient Condition  Indications for airway management: anesthesia  Spontaneous ventilation: present  Sedation level: deep  Preoxygenated: yes  Patient position: sniffing  MILS maintained throughout      Final Airway Details  Final airway type: supraglottic airway      Successful airway: Size 4     Number of attempts at approach: 1    Additional Comments  igel        
Anemia due to blood loss